# Patient Record
Sex: MALE | Race: WHITE | Employment: OTHER | URBAN - METROPOLITAN AREA
[De-identification: names, ages, dates, MRNs, and addresses within clinical notes are randomized per-mention and may not be internally consistent; named-entity substitution may affect disease eponyms.]

---

## 2022-07-12 RX ORDER — ALLOPURINOL 100 MG/1
100 TABLET ORAL DAILY
COMMUNITY

## 2022-07-12 NOTE — PROGRESS NOTES
Name_______________________________________Printed:____________________  Date and time of surgery________7/26/22 0730________________Arrival Time:_____06[facility]___________   1. The instructions given regarding when and if a patient needs to stop oral intake prior to surgery varies. Follow the specific instructions you were given                  _x__Nothing to eat or to drink after Midnight the night before.                   ____Carbo loading or ERAS instructions will be given to select patients-if you have been given those instructions -please do the following                           The evening before your surgery after dinner before midnight drink 40 ounces of gatorade. If you are diabetic use sugar free. The morning of surgery drink 40 ounces of water. This needs to be finished 3 hours prior to your surgery start time. 2. Take the following pills with a small sip of water on the morning of surgery__________carvedilol_________________________________________                  Do not take blood pressure medications ending in pril or sartan the shruthi prior to surgery or the morning of surgery_   3. Aspirin, Ibuprofen, Advil, Naproxen, Vitamin E and other Anti-inflammatory products and supplements should be stopped for 5 -7days before surgery or as directed by your physician. 4. Check with your Doctor regarding stopping Plavix, Coumadin,Eliquis, Lovenox,Effient,Pradaxa,Xarelto, Fragmin or other blood thinners and follow their instructions. 5. Do not smoke, and do not drink any alcoholic beverages 24 hours prior to surgery. This includes NA Beer. Refrain from the usage of any recreational drugs. 6. You may brush your teeth and gargle the morning of surgery. DO NOT SWALLOW WATER   7. You MUST make arrangements for a responsible adult to stay on site while you are here and take you home after your surgery. You will not be allowed to leave alone or drive yourself home.   It is strongly suggested someone stay with you the first 24 hrs. Your surgery will be cancelled if you do not have a ride home. 8. A parent/legal guardian must accompany a child scheduled for surgery and plan to stay at the hospital until the child is discharged. Please do not bring other children with you. 9. Please wear simple, loose fitting clothing to the hospital.  Cristy Regalado not bring valuables (money, credit cards, checkbooks, etc.) Do not wear any makeup (including no eye makeup) or nail polish on your fingers or toes. 10. DO NOT wear any jewelry or piercings on day of surgery. All body piercing jewelry must be removed. 11. If you have ___dentures, they will be removed before going to the OR; we will provide you a container. If you wear ___contact lenses or ___glasses, they will be removed; please bring a case for them. 12. Please see your family doctor/pediatrician for a history & physical and/or concerning medications. Bring any test results/reports from your physician's office. PCP________x__________Phone___________H&P Appt. Date________             13 If you  have a Living Will and Durable Power of  for Healthcare, please bring in a copy. 15. Notify your Surgeon if you develop any illness between now and surgery  time, cough, cold, fever, sore throat, nausea, vomiting, etc.  Please notify your surgeon if you experience dizziness, shortness of breath or blurred vision between now & the time of your surgery             15. DO NOT shave your operative site 96 hours prior to surgery. For face & neck surgery, men may use an electric razor 48 hours prior to surgery. 16. Shower the night before or morning of surgery using an antibacterial soap or as you have been instructed. 17. To provide excellent care visitors will be limited to one in the room at any given time. 18.  Please bring picture ID and insurance card.              19.  Visit our web site for

## 2022-07-14 ENCOUNTER — HOSPITAL ENCOUNTER (OUTPATIENT)
Age: 82
Discharge: HOME | End: 2022-07-14
Payer: MEDICARE

## 2022-07-14 DIAGNOSIS — Z01.818 PREOP TESTING: ICD-10-CM

## 2022-07-14 LAB
ABO/RH: NORMAL
ANION GAP SERPL CALCULATED.3IONS-SCNC: 8 MMOL/L (ref 3–16)
ANTIBODY SCREEN: NORMAL
APTT: 36.1 SEC (ref 23–34.3)
BACTERIA: ABNORMAL /HPF
BASOPHILS ABSOLUTE: 0 K/UL (ref 0–0.2)
BASOPHILS RELATIVE PERCENT: 0.5 %
BILIRUBIN URINE: NEGATIVE
BLOOD, URINE: NEGATIVE
BUN BLDV-MCNC: 20 MG/DL (ref 7–20)
CALCIUM SERPL-MCNC: 9.3 MG/DL (ref 8.3–10.6)
CHLORIDE BLD-SCNC: 103 MMOL/L (ref 99–110)
CLARITY: CLEAR
CO2: 29 MMOL/L (ref 21–32)
COLOR: YELLOW
CREAT SERPL-MCNC: 0.9 MG/DL (ref 0.8–1.3)
EKG ATRIAL RATE: 68 BPM
EKG DIAGNOSIS: NORMAL
EKG Q-T INTERVAL: 444 MS
EKG QRS DURATION: 142 MS
EKG QTC CALCULATION (BAZETT): 469 MS
EKG R AXIS: 200 DEGREES
EKG T AXIS: 41 DEGREES
EKG VENTRICULAR RATE: 67 BPM
EOSINOPHILS ABSOLUTE: 0.1 K/UL (ref 0–0.6)
EOSINOPHILS RELATIVE PERCENT: 1.3 %
EPITHELIAL CELLS, UA: 1 /HPF (ref 0–5)
GFR AFRICAN AMERICAN: >60
GFR NON-AFRICAN AMERICAN: >60
GLUCOSE BLD-MCNC: 85 MG/DL (ref 70–99)
GLUCOSE URINE: NEGATIVE MG/DL
HCT VFR BLD CALC: 42.9 % (ref 40.5–52.5)
HEMOGLOBIN: 14.3 G/DL (ref 13.5–17.5)
HYALINE CASTS: 4 /LPF (ref 0–8)
INR BLD: 1.74 (ref 0.87–1.14)
KETONES, URINE: NEGATIVE MG/DL
LEUKOCYTE ESTERASE, URINE: ABNORMAL
LYMPHOCYTES ABSOLUTE: 1 K/UL (ref 1–5.1)
LYMPHOCYTES RELATIVE PERCENT: 13.4 %
MCH RBC QN AUTO: 33.2 PG (ref 26–34)
MCHC RBC AUTO-ENTMCNC: 33.4 G/DL (ref 31–36)
MCV RBC AUTO: 99.5 FL (ref 80–100)
MICROSCOPIC EXAMINATION: YES
MONOCYTES ABSOLUTE: 0.8 K/UL (ref 0–1.3)
MONOCYTES RELATIVE PERCENT: 9.9 %
NEUTROPHILS ABSOLUTE: 5.7 K/UL (ref 1.7–7.7)
NEUTROPHILS RELATIVE PERCENT: 74.9 %
NITRITE, URINE: POSITIVE
PDW BLD-RTO: 15 % (ref 12.4–15.4)
PH UA: 6 (ref 5–8)
PLATELET # BLD: 222 K/UL (ref 135–450)
PMV BLD AUTO: 8 FL (ref 5–10.5)
POTASSIUM SERPL-SCNC: 4.6 MMOL/L (ref 3.5–5.1)
PROTEIN UA: NEGATIVE MG/DL
PROTHROMBIN TIME: 20.3 SEC (ref 11.7–14.5)
RBC # BLD: 4.31 M/UL (ref 4.2–5.9)
RBC UA: 1 /HPF (ref 0–4)
SODIUM BLD-SCNC: 140 MMOL/L (ref 136–145)
SPECIFIC GRAVITY UA: 1.01 (ref 1–1.03)
URINE TYPE: ABNORMAL
UROBILINOGEN, URINE: 0.2 E.U./DL
WBC # BLD: 7.6 K/UL (ref 4–11)
WBC UA: 89 /HPF (ref 0–5)

## 2022-07-14 PROCEDURE — 36415 COLL VENOUS BLD VENIPUNCTURE: CPT

## 2022-07-14 PROCEDURE — 93010 ELECTROCARDIOGRAM REPORT: CPT | Performed by: INTERNAL MEDICINE

## 2022-07-14 PROCEDURE — 85730 THROMBOPLASTIN TIME PARTIAL: CPT

## 2022-07-14 PROCEDURE — 93005 ELECTROCARDIOGRAM TRACING: CPT | Performed by: ANESTHESIOLOGY

## 2022-07-14 PROCEDURE — 87186 SC STD MICRODIL/AGAR DIL: CPT

## 2022-07-14 PROCEDURE — 87081 CULTURE SCREEN ONLY: CPT

## 2022-07-14 PROCEDURE — 86900 BLOOD TYPING SEROLOGIC ABO: CPT

## 2022-07-14 PROCEDURE — 85610 PROTHROMBIN TIME: CPT

## 2022-07-14 PROCEDURE — 85025 COMPLETE CBC W/AUTO DIFF WBC: CPT

## 2022-07-14 PROCEDURE — 87086 URINE CULTURE/COLONY COUNT: CPT

## 2022-07-14 PROCEDURE — 86850 RBC ANTIBODY SCREEN: CPT

## 2022-07-14 PROCEDURE — 87088 URINE BACTERIA CULTURE: CPT

## 2022-07-14 PROCEDURE — 86901 BLOOD TYPING SEROLOGIC RH(D): CPT

## 2022-07-14 PROCEDURE — 81001 URINALYSIS AUTO W/SCOPE: CPT

## 2022-07-14 PROCEDURE — 80048 BASIC METABOLIC PNL TOTAL CA: CPT

## 2022-07-15 NOTE — CARE COORDINATION
ORTHOPAEDIC NURSE NAVIGATOR SUMMARY NOTE  Sent email message to [facility], Dr. Yo Frazier assistant to help him get an antibiotic for his UTI. Surgery Details  Anticipated Date of Surgery: 07/26/2022  Surgery: right knee  Surgeon: Zak Johnson  Recieved Pre-Op Education: no picked up folder  If pt did not complete either, why not? N/A    PCP  PCP: Joanie Steiner DO   Phone #: [phone]    Date of PCP Visit for H&P: scheduled 7/21  Any Noted Concerns from PCP prior to surgery:  no  If Yes, what concerns?:    Review of Past Medical History Reveals History of:    Patient Medical Hx:  Past Medical History:   Diagnosis Date    Aortic aneurysm ([facility] [address]     Atrial fibrillation ([facility] [address]     Hyperlipidemia     RADHA on CPAP     Osteoporosis     Self-catheterizes urinary bladder     Unspecified sleep apnea      Patients Surgical Hx:   Past Surgical History:   Procedure Laterality Date    BUNIONECTOMY Left     COLONOSCOPY  2005    CYSTOSCOPY  2013    Dr. Margaret Roa Bilateral     realignment    OTHER SURGICAL HISTORY      blood clot in right common illiac vein    PACEMAKER PLACEMENT      PROSTATE SURGERY      turp    THORACIC AORTIC ANEURYSM REPAIR        Patients Social Hx:  Social History       Tobacco History       Smoking Status  Former Quit Date  5/23/1960 Smoking Frequency  0.50 packs/day for 10.00 years (5.00 pk-yrs) Smoking Tobacco Type  Cigarettes quit in 5/23/1960      Smokeless Tobacco Use  Never              Alcohol History       Alcohol Use Status  No              Drug Use       Drug Use Status  No              Sexual Activity       Sexually Active  Yes Partners  Female                  Alcohol use:  Alcohol Use: Not on file     Home Medications:  Prior to Admission medications    Medication Sig Start Date End Date Taking?  Authorizing Provider   allopurinol (ZYLOPRIM) 100 MG tablet Take 100 mg by mouth daily   Yes Historical Provider, MD   CRANBERRY EXTRACT PO Take by mouth   Yes Historical Provider, MD   warfarin (COUMADIN) 5 MG tablet TAKE 1 TABLET BY MOUTH EVERY DAY 6/8/16   Simi Newell DO   carvedilol (COREG) 12.5 MG tablet  3/3/16   Tyler Morton   torsemide (DEMADEX) 20 MG tablet TAKE 1 TABLET BY MOUTH DAILY 4/12/16   Jonathon Hull, DO   fluticasone (FLONASE) 50 MCG/ACT nasal spray USE ONE SPRAY IN EACH NOSTRIL ONCE DAILY 4/12/16   Simi Newell, DO   warfarin (COUMADIN) 6 MG tablet Take 1 tablet by mouth daily 2/18/16   Jonathon Hull, DO   alendronate (FOSAMAX) 70 MG tablet TAKE 1 TABLET EVERY WEEK 2/11/16   Shola Hull, DO   Psyllium (METAMUCIL PO) Take 30 mg by mouth    Historical Provider, MD   tamsulosin (FLOMAX) 0.4 MG capsule Take 0.4 mg by mouth daily. 3/13/13   Historical Provider, MD   NITROSTAT 0.4 MG SL tablet Place 0.4 mg under the tongue every 5 minutes as needed. 2/25/13   Historical Provider, MD   lisinopril (PRINIVIL;ZESTRIL) 2.5 MG tablet Take  by mouth daily. 5/6/13   Historical Provider, MD   Omega-3 Fatty Acids (FISH OIL) 1200 MG CAPS Take 1,200 mg by mouth daily. Historical Provider, MD   Calcium Carbonate (CALTRATE 600 PO) Take 600 mg by mouth daily. Historical Provider, MD        Medication Contract and Consent for Opioid Use Documents Filed        No documents found                   Pain Management Program:  unknown    Lab Values:   Hgb/Hct:   Hemoglobin (g/dL)   Date Value   07/14/2022 14.3   /  Hematocrit (%)   Date Value   07/14/2022 42.9      HgbA1C:    Lab Results   Component Value Date    LABA1C 5.0 08/01/2014      Albumin:    Lab Results   Component Value Date    LABALBU 3.8 06/07/2016      BUN/Cr:   BUN (mg/dL)   Date Value   07/14/2022 20   /  CREATININE (mg/dL)   Date Value   07/14/2022 0.9       Coronary Artery Disease/HTN/CHF History: Yes     Cardiologist: Dr. Molina Royalty    On Anticoagulation? Yes,  If YES, when will they stop taking?  [address] for recommendations from PCP       Diabetes History: No   Most Recent HgbA1C:   Lab Results   Component Value Date    LABA1C 5.0 2014     Pulmonary: COPD/emphysema/Asthma  Use of home oxygen: no    Tobacco Use      Smoking status: Former        Packs/day: 0.50        Years: 10.00        Pack years: 5        Types: Cigarettes        Quit date: 1960        Years since quittin.1      Smokeless tobacco: Never    Referred for Smoking Cessation: No  STOP-BANG SCREEN RISK FACTORS: USES CPAP      DVT Risk Stratification:  higher risk, due to afib. BMI Greater than 40 at time of scheduling?: No   BMI:    BMI Readings from Last 1 Encounters:   22 34.87 kg/m²       Pre-HAB  Prehab Ordered: yes  Attended Pre-Hab Program: yes    Additional Medical Concerns:        Discharge Disposition Information:   Patient insurance on file: Medicare   Anticipated Discharge Disposition:  home with op pt. Already set up, near home. Unsure of location   Who will be with patient at home following discharge? Patient's wife and son, romina Tran,    Equipment pt already has: straight cane, rolling walker, and raised toilet seats   Levels in Home: 3   Bedroom on first or second floor: main   Bathroom on first or second floor: main level   Pre-op ambulatory dme: Independent with Cane   Number of entry steps: 1 or 2 steps    Pt agrees to [address]: Yes         Social Determinants of Health     Tobacco Use: Medium Risk    Smoking Tobacco Use: Former    Smokeless Tobacco Use: Never   Alcohol Use: Not on file   Financial Resource Strain: Not on file   Food Insecurity: Not on file   Transportation Needs: Not on file   Physical Activity: Not on file   Stress: Not on file   Social Connections: Not on file   Intimate Partner Violence: Not on file   Depression: Not on file   Housing Stability: Not on file          Ulises Pepper RN   7/15/2022  Orthopedic Nurse [address] [place] [address]  [phone]  [email or web address]

## 2022-07-16 LAB
MRSA CULTURE ONLY: NORMAL
ORGANISM: ABNORMAL
URINE CULTURE, ROUTINE: ABNORMAL

## 2022-07-18 NOTE — PROGRESS NOTES
Leah at Dr. Karley Augustine office notified of pt's urine cx and states they will notify pt and treat. No further orders received.

## 2022-07-25 ENCOUNTER — ANESTHESIA EVENT (OUTPATIENT)
Dept: OPERATING ROOM | Age: 82
End: 2022-07-25
Payer: MEDICARE

## 2022-07-25 NOTE — DISCHARGE INSTR - OTHER ORDERS
333  [facility] for Joint Replacement Model  Notification Letter    [facility] is participating in a New Care Improvement Initiative from [facility] is participating in a Medicare initiative called the [address] [place] [address] for Joint Replacement (CJR) model. The CJR model aims to promote quality and financial accountability for care surrounding lower-extremity joint replacement (LEJR) procedures, commonly referred to as hip and knee replacements and/or other major leg procedures. [address] participation in the [address] [place] [address],[address] [place] [address] should not restrict your access to care for your medical condition or your freedom to choose your health care providers and services. All existing Medicare beneficiary protections continue to be available to you. These include the ability to report concerns of substandard care to Quality Improvement Organizations and 1-800-MEDICARE. The CJR model aims to help give you better care. The CJR model aims to support better and more efficient care for beneficiaries undergoing LEJR procedures. A CJR episode of care is typically defined as an admission of an eligible Medicare beneficiary to a hospital participating in the [address] [place] [address],[address] [place] [address] that eventually results in a discharge paid under Medicare Severity-Diagnosis Related Groups (MS-DRG) 469 (major joint replacement or reattachment of lower extremity with major complications or comorbidities) or 470 (major joint replacement or reattachment of lower extremity without major complications or comorbidities). The CJR episode of care continues for 90 days following discharge.  This model tests bundled payment and quality measurement for an episode of care associated with LEJR procedures to encourage hospitals, physicians, and post-acute care providers to work together to improve the quality and coordination of care from the initial hospitalization through recovery. Through this bundled payment model, [facility] will receive additional payments if quality and spending performance are strong or, if not, potentially have to repay Medicare for a portion of the spending for care surrounding a lower extremity joint replacement procedure. Medicare is using the CJR model to encourage [facility] to work more closely with your doctors and other health care providers that help patients recover after discharge from the hospital, including nursing homes (skilled nursing facilities), home health agencies, inpatient rehabilitation facilities, and long-term care hospitals. The goal of the model is to encourage these providers and suppliers to provide you with better, more coordinated care during and following your hospital stay. The model is expected to lower the cost of care to [facility] but your costs for covered care will not increase due to these changes. [facility] is working closely with the doctors and other health care providers and suppliers who will care for you during and following your hospital stay and extending through the recovery period. By working together, your health care providers and suppliers are planning more efficient, high quality care as you undergo treatment. Medicare will monitor your care to ensure you and others are receiving high quality care. It's your choice which hospital, doctor, or other providers you use. You have the right to choose which hospital, doctor, or other post-hospital stay health care provider you use. To find a different doctor, visit Medicare's Physician Compare website, HDTapes.co.[email or web address], or call 1-800-MEDICARE (269 7736). TTY users should call 7-285.577.3710. To find a different hospital, visit [employer]Commutable or call 1-800-MEDICARE (754 3582).  TTY users should call [phone]. To find a different skilled nursing facility, visit [facility] website, [email or web address] or call 1-800-MEDICARE (5-307.631.9680). TTY users should call 5-774.585.3512. To find a different home health agency, visit [employer] [facility] [employer] website, [employer].Reedsy, or call 1-800-MEDICARE (5-573.107.7745). TTY users should call 5-833.204.3107. If you believe that your care is adversely affected or have concerns about substandard care, you may call 1-800-MEDICARE or contact your state's Quality Improvement Organization by going to: [employer].Alignable. For an explanation of how patients can access their health care records and beneficiary claims data, please visit [email or web address]. Get more information     If you have questions or want more information about the Comprehensive Care for Joint Replacement (CJR) model, call Fidencio Zavala RN, Care Transitions, [facility] at [phone] or call 1-800-MEDICARE. You can also find additional information at https://innovation.cms.gov/initiatives/cjr.

## 2022-07-26 ENCOUNTER — APPOINTMENT (OUTPATIENT)
Dept: GENERAL RADIOLOGY | Age: 82
End: 2022-07-26
Attending: ORTHOPAEDIC SURGERY
Payer: MEDICARE

## 2022-07-26 ENCOUNTER — ANESTHESIA (OUTPATIENT)
Dept: OPERATING ROOM | Age: 82
End: 2022-07-26
Payer: MEDICARE

## 2022-07-26 ENCOUNTER — HOSPITAL ENCOUNTER (OUTPATIENT)
Age: 82
Setting detail: OUTPATIENT SURGERY
Discharge: HOME | End: 2022-07-26
Attending: ORTHOPAEDIC SURGERY | Admitting: ORTHOPAEDIC SURGERY
Payer: MEDICARE

## 2022-07-26 VITALS
HEART RATE: 60 BPM | RESPIRATION RATE: 18 BRPM | TEMPERATURE: 97 F | WEIGHT: 240 LBS | DIASTOLIC BLOOD PRESSURE: 68 MMHG | BODY MASS INDEX: 34.36 KG/M2 | HEIGHT: 70 IN | SYSTOLIC BLOOD PRESSURE: 122 MMHG | OXYGEN SATURATION: 95 %

## 2022-07-26 DIAGNOSIS — Z01.818 PREOP TESTING: Primary | ICD-10-CM

## 2022-07-26 LAB
ABO/RH: NORMAL
ANTIBODY SCREEN: NORMAL
APTT: 41.1 SEC (ref 23–34.3)
GLUCOSE BLD-MCNC: 94 MG/DL (ref 70–99)
INR BLD: 1.26 (ref 0.87–1.14)
PERFORMED ON: NORMAL
PROTHROMBIN TIME: 15.8 SEC (ref 11.7–14.5)

## 2022-07-26 PROCEDURE — 73560 X-RAY EXAM OF KNEE 1 OR 2: CPT

## 2022-07-26 PROCEDURE — 2500000003 HC RX 250 WO HCPCS: Performed by: ORTHOPAEDIC SURGERY

## 2022-07-26 PROCEDURE — 6360000002 HC RX W HCPCS: Performed by: ORTHOPAEDIC SURGERY

## 2022-07-26 PROCEDURE — 97165 OT EVAL LOW COMPLEX 30 MIN: CPT

## 2022-07-26 PROCEDURE — 6360000002 HC RX W HCPCS: Performed by: NURSE ANESTHETIST, CERTIFIED REGISTERED

## 2022-07-26 PROCEDURE — 6360000002 HC RX W HCPCS

## 2022-07-26 PROCEDURE — 7100000001 HC PACU RECOVERY - ADDTL 15 MIN: Performed by: ORTHOPAEDIC SURGERY

## 2022-07-26 PROCEDURE — 3600000014 HC SURGERY LEVEL 4 ADDTL 15MIN: Performed by: ORTHOPAEDIC SURGERY

## 2022-07-26 PROCEDURE — 7100000000 HC PACU RECOVERY - FIRST 15 MIN: Performed by: ORTHOPAEDIC SURGERY

## 2022-07-26 PROCEDURE — 2500000003 HC RX 250 WO HCPCS: Performed by: NURSE ANESTHETIST, CERTIFIED REGISTERED

## 2022-07-26 PROCEDURE — A4217 STERILE WATER/SALINE, 500 ML: HCPCS | Performed by: ORTHOPAEDIC SURGERY

## 2022-07-26 PROCEDURE — 86901 BLOOD TYPING SEROLOGIC RH(D): CPT

## 2022-07-26 PROCEDURE — 85730 THROMBOPLASTIN TIME PARTIAL: CPT

## 2022-07-26 PROCEDURE — 64447 NJX AA&/STRD FEMORAL NRV IMG: CPT | Performed by: ANESTHESIOLOGY

## 2022-07-26 PROCEDURE — 2580000003 HC RX 258: Performed by: ORTHOPAEDIC SURGERY

## 2022-07-26 PROCEDURE — 86900 BLOOD TYPING SEROLOGIC ABO: CPT

## 2022-07-26 PROCEDURE — 3700000000 HC ANESTHESIA ATTENDED CARE: Performed by: ORTHOPAEDIC SURGERY

## 2022-07-26 PROCEDURE — C1776 JOINT DEVICE (IMPLANTABLE): HCPCS | Performed by: ORTHOPAEDIC SURGERY

## 2022-07-26 PROCEDURE — 7100000011 HC PHASE II RECOVERY - ADDTL 15 MIN: Performed by: ORTHOPAEDIC SURGERY

## 2022-07-26 PROCEDURE — C1713 ANCHOR/SCREW BN/BN,TIS/BN: HCPCS | Performed by: ORTHOPAEDIC SURGERY

## 2022-07-26 PROCEDURE — 3700000001 HC ADD 15 MINUTES (ANESTHESIA): Performed by: ORTHOPAEDIC SURGERY

## 2022-07-26 PROCEDURE — 97161 PT EVAL LOW COMPLEX 20 MIN: CPT

## 2022-07-26 PROCEDURE — 85610 PROTHROMBIN TIME: CPT

## 2022-07-26 PROCEDURE — 7100000010 HC PHASE II RECOVERY - FIRST 15 MIN: Performed by: ORTHOPAEDIC SURGERY

## 2022-07-26 PROCEDURE — 86850 RBC ANTIBODY SCREEN: CPT

## 2022-07-26 PROCEDURE — 3600000004 HC SURGERY LEVEL 4 BASE: Performed by: ORTHOPAEDIC SURGERY

## 2022-07-26 PROCEDURE — 2500000003 HC RX 250 WO HCPCS: Performed by: ANESTHESIOLOGY

## 2022-07-26 PROCEDURE — 6360000002 HC RX W HCPCS: Performed by: ANESTHESIOLOGY

## 2022-07-26 PROCEDURE — 2580000003 HC RX 258

## 2022-07-26 PROCEDURE — 2720000010 HC SURG SUPPLY STERILE: Performed by: ORTHOPAEDIC SURGERY

## 2022-07-26 PROCEDURE — 2709999900 HC NON-CHARGEABLE SUPPLY: Performed by: ORTHOPAEDIC SURGERY

## 2022-07-26 PROCEDURE — 97535 SELF CARE MNGMENT TRAINING: CPT

## 2022-07-26 PROCEDURE — 97530 THERAPEUTIC ACTIVITIES: CPT

## 2022-07-26 DEVICE — PSN TIB STM 5 DEG SZ H R: Type: IMPLANTABLE DEVICE | Site: KNEE | Status: FUNCTIONAL

## 2022-07-26 DEVICE — PSN MC VE ASF R 14MM 8-11 GH: Type: IMPLANTABLE DEVICE | Site: KNEE | Status: FUNCTIONAL

## 2022-07-26 DEVICE — COMPONENT PAT DIA35MM THK9MM STD KNEE VIVACIT-E CEM PERSONA: Type: IMPLANTABLE DEVICE | Site: KNEE | Status: FUNCTIONAL

## 2022-07-26 DEVICE — CEMENT BNE 40GM HI VISC RADPQ FOR REV SURG: Type: IMPLANTABLE DEVICE | Site: KNEE | Status: FUNCTIONAL

## 2022-07-26 DEVICE — IMPLANTABLE DEVICE: Type: IMPLANTABLE DEVICE | Site: KNEE | Status: FUNCTIONAL

## 2022-07-26 RX ORDER — ONDANSETRON 2 MG/ML
4 INJECTION INTRAMUSCULAR; INTRAVENOUS
Status: COMPLETED | OUTPATIENT
Start: 2022-07-26 | End: 2022-07-26

## 2022-07-26 RX ORDER — PROPOFOL 10 MG/ML
INJECTION, EMULSION INTRAVENOUS PRN
Status: DISCONTINUED | OUTPATIENT
Start: 2022-07-26 | End: 2022-07-26 | Stop reason: SDUPTHER

## 2022-07-26 RX ORDER — DEXMEDETOMIDINE HYDROCHLORIDE 100 UG/ML
INJECTION, SOLUTION INTRAVENOUS PRN
Status: DISCONTINUED | OUTPATIENT
Start: 2022-07-26 | End: 2022-07-26 | Stop reason: SDUPTHER

## 2022-07-26 RX ORDER — HYDROMORPHONE HCL 110MG/55ML
0.25 PATIENT CONTROLLED ANALGESIA SYRINGE INTRAVENOUS
Status: CANCELLED | OUTPATIENT
Start: 2022-07-26

## 2022-07-26 RX ORDER — DEXAMETHASONE SODIUM PHOSPHATE 4 MG/ML
INJECTION, SOLUTION INTRA-ARTICULAR; INTRALESIONAL; INTRAMUSCULAR; INTRAVENOUS; SOFT TISSUE PRN
Status: DISCONTINUED | OUTPATIENT
Start: 2022-07-26 | End: 2022-07-26 | Stop reason: SDUPTHER

## 2022-07-26 RX ORDER — LIDOCAINE HYDROCHLORIDE 20 MG/ML
INJECTION, SOLUTION EPIDURAL; INFILTRATION; INTRACAUDAL; PERINEURAL PRN
Status: DISCONTINUED | OUTPATIENT
Start: 2022-07-26 | End: 2022-07-26 | Stop reason: SDUPTHER

## 2022-07-26 RX ORDER — KETAMINE HCL IN NACL, ISO-OSM 100MG/10ML
SYRINGE (ML) INJECTION PRN
Status: DISCONTINUED | OUTPATIENT
Start: 2022-07-26 | End: 2022-07-26 | Stop reason: SDUPTHER

## 2022-07-26 RX ORDER — LIDOCAINE HYDROCHLORIDE 10 MG/ML
1 INJECTION, SOLUTION EPIDURAL; INFILTRATION; INTRACAUDAL; PERINEURAL
Status: CANCELLED | OUTPATIENT
Start: 2022-07-26 | End: 2022-07-26

## 2022-07-26 RX ORDER — SODIUM CHLORIDE, SODIUM LACTATE, POTASSIUM CHLORIDE, CALCIUM CHLORIDE 600; 310; 30; 20 MG/100ML; MG/100ML; MG/100ML; MG/100ML
INJECTION, SOLUTION INTRAVENOUS CONTINUOUS
Status: DISCONTINUED | OUTPATIENT
Start: 2022-07-26 | End: 2022-07-26 | Stop reason: HOSPADM

## 2022-07-26 RX ORDER — LABETALOL HYDROCHLORIDE 5 MG/ML
10 INJECTION, SOLUTION INTRAVENOUS
Status: DISCONTINUED | OUTPATIENT
Start: 2022-07-26 | End: 2022-07-26 | Stop reason: HOSPADM

## 2022-07-26 RX ORDER — SODIUM CHLORIDE 9 MG/ML
INJECTION, SOLUTION INTRAVENOUS CONTINUOUS
Status: CANCELLED | OUTPATIENT
Start: 2022-07-26

## 2022-07-26 RX ORDER — FENTANYL CITRATE 50 UG/ML
25 INJECTION, SOLUTION INTRAMUSCULAR; INTRAVENOUS EVERY 5 MIN PRN
Status: DISCONTINUED | OUTPATIENT
Start: 2022-07-26 | End: 2022-07-26 | Stop reason: HOSPADM

## 2022-07-26 RX ORDER — ONDANSETRON 2 MG/ML
4 INJECTION INTRAMUSCULAR; INTRAVENOUS EVERY 6 HOURS PRN
Status: CANCELLED | OUTPATIENT
Start: 2022-07-26

## 2022-07-26 RX ORDER — HYDROMORPHONE HCL 110MG/55ML
0.25 PATIENT CONTROLLED ANALGESIA SYRINGE INTRAVENOUS EVERY 5 MIN PRN
Status: DISCONTINUED | OUTPATIENT
Start: 2022-07-26 | End: 2022-07-26 | Stop reason: HOSPADM

## 2022-07-26 RX ORDER — ACETAMINOPHEN 325 MG/1
650 TABLET ORAL EVERY 6 HOURS
Status: CANCELLED | OUTPATIENT
Start: 2022-07-26

## 2022-07-26 RX ORDER — SUCCINYLCHOLINE/SOD CL,ISO/PF 200MG/10ML
SYRINGE (ML) INTRAVENOUS PRN
Status: DISCONTINUED | OUTPATIENT
Start: 2022-07-26 | End: 2022-07-26 | Stop reason: SDUPTHER

## 2022-07-26 RX ORDER — PHENYLEPHRINE HYDROCHLORIDE 10 MG/ML
INJECTION INTRAVENOUS PRN
Status: DISCONTINUED | OUTPATIENT
Start: 2022-07-26 | End: 2022-07-26 | Stop reason: SDUPTHER

## 2022-07-26 RX ORDER — IBUPROFEN 400 MG/1
400 TABLET ORAL EVERY 12 HOURS
Status: CANCELLED | OUTPATIENT
Start: 2022-07-26 | End: 2022-07-29

## 2022-07-26 RX ORDER — HYDROMORPHONE HCL 110MG/55ML
0.5 PATIENT CONTROLLED ANALGESIA SYRINGE INTRAVENOUS
Status: CANCELLED | OUTPATIENT
Start: 2022-07-26

## 2022-07-26 RX ORDER — ROCURONIUM BROMIDE 10 MG/ML
INJECTION, SOLUTION INTRAVENOUS PRN
Status: DISCONTINUED | OUTPATIENT
Start: 2022-07-26 | End: 2022-07-26 | Stop reason: SDUPTHER

## 2022-07-26 RX ORDER — BUPIVACAINE HYDROCHLORIDE 5 MG/ML
INJECTION, SOLUTION EPIDURAL; INTRACAUDAL
Status: COMPLETED | OUTPATIENT
Start: 2022-07-26 | End: 2022-07-26

## 2022-07-26 RX ORDER — LIDOCAINE HYDROCHLORIDE 10 MG/ML
0.5 INJECTION, SOLUTION EPIDURAL; INFILTRATION; INTRACAUDAL; PERINEURAL ONCE
Status: DISCONTINUED | OUTPATIENT
Start: 2022-07-26 | End: 2022-07-26 | Stop reason: HOSPADM

## 2022-07-26 RX ORDER — OXYCODONE HYDROCHLORIDE 5 MG/1
5 TABLET ORAL
Status: DISCONTINUED | OUTPATIENT
Start: 2022-07-26 | End: 2022-07-26 | Stop reason: HOSPADM

## 2022-07-26 RX ORDER — PROMETHAZINE HYDROCHLORIDE 25 MG/1
12.5 TABLET ORAL EVERY 6 HOURS PRN
Status: CANCELLED | OUTPATIENT
Start: 2022-07-26

## 2022-07-26 RX ORDER — FENTANYL CITRATE 50 UG/ML
INJECTION, SOLUTION INTRAMUSCULAR; INTRAVENOUS PRN
Status: DISCONTINUED | OUTPATIENT
Start: 2022-07-26 | End: 2022-07-26 | Stop reason: SDUPTHER

## 2022-07-26 RX ORDER — ONDANSETRON 2 MG/ML
INJECTION INTRAMUSCULAR; INTRAVENOUS PRN
Status: DISCONTINUED | OUTPATIENT
Start: 2022-07-26 | End: 2022-07-26 | Stop reason: SDUPTHER

## 2022-07-26 RX ORDER — SODIUM CHLORIDE 0.9 % (FLUSH) 0.9 %
5-40 SYRINGE (ML) INJECTION EVERY 12 HOURS SCHEDULED
Status: CANCELLED | OUTPATIENT
Start: 2022-07-26

## 2022-07-26 RX ORDER — PROCHLORPERAZINE EDISYLATE 5 MG/ML
5 INJECTION INTRAMUSCULAR; INTRAVENOUS
Status: DISCONTINUED | OUTPATIENT
Start: 2022-07-26 | End: 2022-07-26 | Stop reason: HOSPADM

## 2022-07-26 RX ORDER — SODIUM CHLORIDE 0.9 % (FLUSH) 0.9 %
5-40 SYRINGE (ML) INJECTION PRN
Status: CANCELLED | OUTPATIENT
Start: 2022-07-26

## 2022-07-26 RX ORDER — SODIUM CHLORIDE 9 MG/ML
INJECTION, SOLUTION INTRAVENOUS PRN
Status: CANCELLED | OUTPATIENT
Start: 2022-07-26

## 2022-07-26 RX ORDER — HYDRALAZINE HYDROCHLORIDE 20 MG/ML
10 INJECTION INTRAMUSCULAR; INTRAVENOUS
Status: DISCONTINUED | OUTPATIENT
Start: 2022-07-26 | End: 2022-07-26 | Stop reason: HOSPADM

## 2022-07-26 RX ADMIN — DEXMEDETOMIDINE HYDROCHLORIDE 4 MCG: 100 INJECTION, SOLUTION INTRAVENOUS at 08:06

## 2022-07-26 RX ADMIN — ROCURONIUM BROMIDE 30 MG: 10 INJECTION, SOLUTION INTRAVENOUS at 07:43

## 2022-07-26 RX ADMIN — SODIUM CHLORIDE, POTASSIUM CHLORIDE, SODIUM LACTATE AND CALCIUM CHLORIDE: 600; 310; 30; 20 INJECTION, SOLUTION INTRAVENOUS at 06:38

## 2022-07-26 RX ADMIN — DEXAMETHASONE SODIUM PHOSPHATE 4 MG: 4 INJECTION, SOLUTION INTRAMUSCULAR; INTRAVENOUS at 07:47

## 2022-07-26 RX ADMIN — TRANEXAMIC ACID 1000 MG: 1 INJECTION, SOLUTION INTRAVENOUS at 07:19

## 2022-07-26 RX ADMIN — SUGAMMADEX 200 MG: 100 INJECTION, SOLUTION INTRAVENOUS at 08:55

## 2022-07-26 RX ADMIN — PHENYLEPHRINE HYDROCHLORIDE 100 MCG: 10 INJECTION INTRAVENOUS at 07:46

## 2022-07-26 RX ADMIN — CEFAZOLIN 2000 MG: 2 INJECTION, POWDER, FOR SOLUTION INTRAMUSCULAR; INTRAVENOUS at 12:07

## 2022-07-26 RX ADMIN — Medication 10 MG: at 08:41

## 2022-07-26 RX ADMIN — ROCURONIUM BROMIDE 10 MG: 10 INJECTION, SOLUTION INTRAVENOUS at 07:33

## 2022-07-26 RX ADMIN — BUPIVACAINE HYDROCHLORIDE 20 ML: 5 INJECTION, SOLUTION EPIDURAL; INTRACAUDAL at 07:11

## 2022-07-26 RX ADMIN — ONDANSETRON 4 MG: 2 INJECTION INTRAMUSCULAR; INTRAVENOUS at 07:47

## 2022-07-26 RX ADMIN — DEXMEDETOMIDINE HYDROCHLORIDE 4 MCG: 100 INJECTION, SOLUTION INTRAVENOUS at 08:42

## 2022-07-26 RX ADMIN — LIDOCAINE HYDROCHLORIDE 60 MG: 20 INJECTION, SOLUTION EPIDURAL; INFILTRATION; INTRACAUDAL; PERINEURAL at 07:33

## 2022-07-26 RX ADMIN — Medication 30 MG: at 07:31

## 2022-07-26 RX ADMIN — FENTANYL CITRATE 25 MCG: 50 INJECTION, SOLUTION INTRAMUSCULAR; INTRAVENOUS at 09:00

## 2022-07-26 RX ADMIN — ONDANSETRON 4 MG: 2 INJECTION INTRAMUSCULAR; INTRAVENOUS at 10:06

## 2022-07-26 RX ADMIN — CEFAZOLIN 2000 MG: 2 INJECTION, POWDER, FOR SOLUTION INTRAMUSCULAR; INTRAVENOUS at 07:38

## 2022-07-26 RX ADMIN — Medication 160 MG: at 07:33

## 2022-07-26 RX ADMIN — PHENYLEPHRINE HYDROCHLORIDE 100 MCG: 10 INJECTION INTRAVENOUS at 07:48

## 2022-07-26 RX ADMIN — ROCURONIUM BROMIDE 10 MG: 10 INJECTION, SOLUTION INTRAVENOUS at 08:38

## 2022-07-26 RX ADMIN — DEXMEDETOMIDINE HYDROCHLORIDE 8 MCG: 100 INJECTION, SOLUTION INTRAVENOUS at 07:55

## 2022-07-26 RX ADMIN — TRANEXAMIC ACID 1000 MG: 1 INJECTION, SOLUTION INTRAVENOUS at 08:46

## 2022-07-26 RX ADMIN — PROPOFOL 150 MG: 10 INJECTION, EMULSION INTRAVENOUS at 07:33

## 2022-07-26 RX ADMIN — FENTANYL CITRATE 50 MCG: 50 INJECTION, SOLUTION INTRAMUSCULAR; INTRAVENOUS at 08:54

## 2022-07-26 RX ADMIN — Medication 10 MG: at 08:06

## 2022-07-26 RX ADMIN — FENTANYL CITRATE 25 MCG: 50 INJECTION, SOLUTION INTRAMUSCULAR; INTRAVENOUS at 07:31

## 2022-07-26 ASSESSMENT — PAIN SCALES - GENERAL
PAINLEVEL_OUTOF10: 4
PAINLEVEL_OUTOF10: 4
PAINLEVEL_OUTOF10: 3

## 2022-07-26 ASSESSMENT — PAIN DESCRIPTION - DESCRIPTORS
DESCRIPTORS: ACHING
DESCRIPTORS: ACHING

## 2022-07-26 ASSESSMENT — ENCOUNTER SYMPTOMS: SHORTNESS OF BREATH: 0

## 2022-07-26 ASSESSMENT — PAIN DESCRIPTION - PAIN TYPE
TYPE: SURGICAL PAIN
TYPE: SURGICAL PAIN

## 2022-07-26 ASSESSMENT — PAIN - FUNCTIONAL ASSESSMENT: PAIN_FUNCTIONAL_ASSESSMENT: 0-10

## 2022-07-26 ASSESSMENT — PAIN DESCRIPTION - ORIENTATION
ORIENTATION: RIGHT
ORIENTATION: RIGHT

## 2022-07-26 ASSESSMENT — PAIN DESCRIPTION - LOCATION
LOCATION: KNEE
LOCATION: KNEE

## 2022-07-26 ASSESSMENT — PAIN DESCRIPTION - FREQUENCY
FREQUENCY: CONTINUOUS
FREQUENCY: CONTINUOUS

## 2022-07-26 NOTE — PROGRESS NOTES
Pt assisted to sitting position on side of bed. Pt able to tolerate well. PT/OT contacted for eval. Pt reports nausea with position change. Pt given Zofran as ordered. Pt reports improvement with nausea.

## 2022-07-26 NOTE — PROGRESS NOTES
Pt awake and alert. Pt on RA, VSS. Pt reports acceptable 4/10 pain declines need for pain medication. Denies nausea, tolerating PO. Surrounding skin warm, palpable pulses and able to move toes. Pt meets criteria to be discharged from Phase 1. Will continue to monitor.

## 2022-07-26 NOTE — BRIEF OP NOTE
Brief Postoperative Note      Patient: Karrie Muhammad  YOB: 1940  MRN: [id number]    Date of Procedure: 7/26/2022    Pre-Op Diagnosis: M17.11 RIGHT KNEE OSTEOARTHRITIS    Post-Op Diagnosis: Same       Procedure(s):  RIGHT TOTAL KNEE ARTHROPLASTY-BLOCK NON-CONTINUOUS-AILEEN    Surgeon(s):  Brianne Garcias MD    First assist: shagufta ann pa-c    Assistant:  Surgical Assistant: Pricila Smith    Anesthesia: General    Estimated Blood Loss (mL): less than 50     Complications: None    Specimens:   * No specimens in log *    Implants:  Implant Name Type Inv.  Item Serial No.  Lot No. LRB No. Used Action   CEMENT BNE 40GM HI VISC RADPQ FOR REV SURG - [id number]  CEMENT BNE 40GM HI VISC RADPQ FOR REV SURG  [employer]- [id number] Right 1 Implanted   CEMENT BNE 40GM HI VISC RADPQ FOR REV SURG - [id number]  CEMENT BNE 40GM HI VISC RADPQ FOR REV SURG  [employer]- [id number] Right 1 Implanted   PSN TIB STM 5 DEG SZ H R - [id number]  PSN TIB STM 5 DEG SZ H R  [employer]- [id number] Right 1 Implanted   COMPONENT PAT [id number] THK9MM STD KNEE VIVACIT-E BISI PERSONA - [id number]  COMPONENT PAT [id number] THK9MM STD KNEE VIVACIT-E BISI PERSONA  [employer]- [id number] Right 1 Implanted   COMPONENT FEM SZ 11 STD R KNEE CO CHROME CRUCE RET BISI PERSO - [id number]  COMPONENT FEM SZ 11 STD R KNEE CO CHROME CRUCE RET BISI PERSO  [employer]- [id number] Right 1 Implanted   PSN MC VE ASF R 14MM 8-11 GH - [id number]  PSN MC VE ASF R 14MM 8-11 GH  [employer]- [id number] Right 1 Implanted         Drains: * No LDAs found *    Findings: oa varus severe    Electronically signed by Brianne Garcias MD on 7/26/2022 at 8:49 AM

## 2022-07-26 NOTE — PROGRESS NOTES
Pt assisted to lying position and repositioned in bed. Pt tolerated well. Ice pack applied to right knee. Pt denies any further needs at this time. Will continue to monitor.

## 2022-07-26 NOTE — PROGRESS NOTES
Time out done, @ room air,then Dr Jannie Davies completed right adductor canal block, patient tolerated procedure well.

## 2022-07-26 NOTE — ANESTHESIA POSTPROCEDURE EVALUATION
Department of Anesthesiology  Postprocedure Note    Patient: Delfino Sandoval  MRN: [id number]  YOB: 1940  Date of evaluation: 7/26/2022      Procedure Summary     Date: 07/26/22 Room / Location: [address]    Anesthesia Start: 9928 Anesthesia Stop: 8432    Procedure: RIGHT TOTAL KNEE ARTHROPLASTY-BLOCK NON-CONTINUOUS-AILEEN (Right: Knee) Diagnosis:       Osteoarthritis of right knee, unspecified osteoarthritis type      (M17.11 RIGHT KNEE OSTEOARTHRITIS)    Surgeons: Anjel Huffman MD Responsible Provider: Petty Novoa MD    Anesthesia Type: general, regional ASA Status: 3          Anesthesia Type: No value filed.     Jazmin Phase I: Jazmin Score: 10    Jazmin Phase II:        Anesthesia Post Evaluation    Patient location during evaluation: PACU  Patient participation: complete - patient participated  Level of consciousness: awake and alert  Airway patency: patent  Nausea & Vomiting: no nausea and no vomiting  Complications: no  Cardiovascular status: hemodynamically stable  Respiratory status: acceptable  Hydration status: stable  Multimodal analgesia pain management approach

## 2022-07-26 NOTE — PROGRESS NOTES
Pt able to tolerate PO without N/V. Pt reports continued nausea relief with PO and nausea medication.

## 2022-07-26 NOTE — OP NOTE
Operative Note      Patient: Jr Palacios  YOB: 1940  MRN: [id number]    Date of Procedure: 7/26/2022    Pre-Op Diagnosis: M17.11 RIGHT KNEE OSTEOARTHRITIS    Post-Op Diagnosis: Same       Procedure(s):  RIGHT TOTAL KNEE ARTHROPLASTY-BLOCK NON-CONTINUOUS-AILEEN    Surgeon(s):  Chastity Swann MD    First Assistant: Queenie Engel PA-C  The PA was integral to the completion of the surgery efficiently and safely. He assisted with positioning, exposure, application of implants, wound closure and dressings. Assistant:   Surgical Assistant: Molly Johnston    Anesthesia: General    Estimated Blood Loss (mL): less than 50     Complications: None    Specimens:   * No specimens in log *    Implants:  Implant Name Type Inv. Item Serial No.  Lot No. LRB No. Used Action   CEMENT BNE 40GM HI VISC RADPQ FOR REV SURG - [id number]  CEMENT BNE 40GM HI VISC RADPQ FOR REV SURG  [employer]- [id number] Right 1 Implanted   CEMENT BNE 40GM HI VISC RADPQ FOR REV SURG - [id number]  CEMENT BNE 40GM HI VISC RADPQ FOR REV SURG  [employer]- [id number] Right 1 Implanted   PSN TIB STM 5 DEG SZ H R - [id number]  PSN TIB STM 5 DEG SZ H R  [employer]- [id number] Right 1 Implanted   COMPONENT PAT [id number] THK9MM STD KNEE VIVACIT-E BISI PERSONA - [id number]  COMPONENT PAT [id number] THK9MM STD KNEE VIVACIT-E BISI PERSONA  [employer]- [id number] Right 1 Implanted   COMPONENT FEM SZ 11 STD R KNEE CO CHROME CRUCE RET BISI PERSO - [id number]  COMPONENT FEM SZ 11 STD R KNEE CO CHROME CRUCE RET BISI PERSO  [employer]- [id number] Right 1 Implanted   PSN MC VE ASF R 14MM 8-11 GH - [id number]  PSN MC VE ASF R 14MM 8-11 GH  [employer]- [id number] Right 1 Implanted         Drains: * No LDAs found *    Findings: Severe varus deformity of about 10 degrees.   Large osteophytes at all 3 compartments with severe medial compartment wear causing varus deformity. Excellent stability with the 14 spacer in place and a medial release and a balanced cut at the femur and tibia using alignment guides. Full range of motion 0-130 degrees with good patella tracking at completion. Detailed Description of Procedure:   Implant specifics Peter persona total knee system size 11 cruciate retaining femur, size H tibia size 35 patella and size 14 medial congruent spacer    Patient was placed under regional block in the holding area. Then placed under general anesthesia supine. Routine positioning for total knee with a tourniquet in the Pina leg bianchi utilized. Ioban placed. Midline incision made and the medial patellar parapatellar arthrotomy was made. Bone spurs resected and synovectomy performed. Patella everted and resected with a saw taking 10 mm. 35 measured and 3 lug holes made. The knee was then flexed and the proximal tibia was resected with retractors placed around the tibia. A medial release with a Singleton was performed first.  I resected the tibia with a soft taking 2 mm from the low medial side. I then resected the distal femur using the 5 degree valgus alignment for a right knee. I then measured the space and found to be well balanced. Posterior osteophytes were removed with the osteotome. The femur was finished and the tibial fin was cut trials were placed cement was then mixed and the final implants were placed. All excess cement was removed. a thorough irrigation with Irrisept and pulse lavage. The orthopedic cocktail was then injected into the posterior and medial deep soft tissues followed by injection into the superficial soft tissues anterior lateral and medial.    A capsule closure with interrupted #2 Ethibond was performed . Further capsular closure was placed with the running strata fix #1 suture.   Further closure of the subcutaneous tissue was closed with #0 Vicryl followed by a running 3-0 strata fix and the 4-0 Monocryl followed by Clancy Gitelman and a Mepilex dressing.     Electronically signed by Brianne Garcias MD on 7/26/2022 at 8:50 AM

## 2022-07-26 NOTE — PROGRESS NOTES
Pt arrived from OR to PACU bay 8. Reported received from [address] staff. Pt is arousable to voice. Surgical incisions dressings in place to right knee. Pt on 6L simple mask, NSR/paced, and VSS. Will continue to monitor.

## 2022-07-26 NOTE — PROGRESS NOTES
Discharge instructions review with patient and son. All home medications have been reviewed, pt v/u. Discharge instructions signed. Pt discharged via wheelchair. Pt discharged with all belongings. Son is taking stable pt home.

## 2022-07-26 NOTE — H&P
Date of Surgery Update:  Delfino Sandovla was seen, history and physical examination reviewed, and patient examined by me today. There have been no significant clinical changes since the completion of the previous history and physical.    The risk, benefits, and alternatives of the proposed procedure have been explained to the patient (or appropriate guardian) and understanding verbalized. All questions answered. Patient wishes to proceed.     Electronically signed by: Anjel Huffman MD,7/26/2022,7:11 AM

## 2022-07-26 NOTE — PROGRESS NOTES
[facility]   Phone: [phone]    Occupational Therapy    [x] Initial Evaluation            [] Daily Treatment Note         [] Discharge Summary      Patient: Fe Maradiaga   : 1940   MRN: [id number]   Date of Service:  2022    Admitting Diagnosis:  <principal problem not specified>  Current Admission Summary: RIGHT TOTAL KNEE ARTHROPLASTY   Past Medical History:  has a past medical history of Aortic aneurysm ([address] Atrial fibrillation ([address] Hyperlipidemia, RADHA on CPAP, Osteoporosis, Self-catheterizes urinary bladder, and Unspecified sleep apnea. Past Surgical History:  has a past surgical history that includes Bunionectomy (Left); Colonoscopy (); other surgical history; Prostate surgery; Cystocopy (); joint replacement (Bilateral); pacemaker placement; Thoracic aortic aneurysm repair; and Total knee arthroplasty (Right, 2022). Discharge Recommendations: Fe Maradiaga scored a 20/24 on the AM-PAC ADL Inpatient form. Current research shows that an AM-PAC score of 18 or greater is typically associated with a discharge to the patient's home setting. Based on the patient's AM-PAC score, and their current ADL deficits, it is recommended that the patient have 2-3 sessions per week of Occupational Therapy at d/c to increase the patient's independence. At this time, this patient demonstrates the endurance and safety to discharge home with home health (home vs OP services) and a follow up treatment frequency of 2-3x/wk. Please see assessment section for further patient specific details. If patient discharges prior to next session this note will serve as a discharge summary. Please see below for the latest assessment towards goals.       HOME HEALTH CARE: LEVEL 1 STANDARD    - Initial home health evaluation to occur within 24-48 hours, in patient home   - Therapy to evaluate with goal of regaining prior level of functioning   - Therapy to evaluate if patient has [address] needs for personal care      DME Required For Discharge: no DME required at discharge    Precautions/Restrictions: medium fall risk, up as tolerated  Weight Bearing Restrictions: weight bearing as tolerated     Required Braces/Orthotics: no braces required  Positional Restrictions:no positional restrictions    Pre-Admission Information   Lives With: spouse    Type of Home: house  Home Layout: two level, able to live on main level, . Comment: basement  Home Access:  2 step to enter with handrail. Handrails are located on Right side. Bathroom Layout: walker accessible, wheelchair accessible, walk in shower  Toilet Height: elevated height  Bathroom Equipment: shower chair  Home Equipment: rolling walker, single point cane, lift chair, reacher, sock aide  Transfer Assistance: Independent without use of device  Ambulation Assistance:modified independent with use of cane if pain is manageable, walker for community  ADL Assistance: independent with all ADL's  IADL Assistance: independent with homemaking tasks  Active :        [x] Yes  [] No  Hand Dominance: [] Left  [] Right  Current Employment: retired. Hobbies: pt is a primary caregiver for his wife  Recent Falls: 1, two weeks ago  Comment: Two sons to provide assistance, pt's wife has dementia, pt is the primary caregiver    Examination   Vision:   Vision Gross Assessment: WFL  Hearing:   WFL  Observation:   General Observation:  Pt with ACE wrap on RLE  Posture:   WFL  Sensation:   WFL  ROM:   (B) UE AROM WFL  Strength:   (B) UE strength grossly WFL    Decision Making: low complexity  Clinical Presentation: stable      Subjective  General: Pt supine in bed with son present. Pt agreeable to OT/PT co-tx and eval.  Pain: 5/10. Location: Knee  Pain Interventions: patient denies pain interventions        Activities of Daily Living  Basic Activities of Daily Living  Upper Extremity Dressing: stand by assistance. Equipment: none  Lower Extremity Dressing: maximum assistance. Equipment: none  Dressing Comments: pt completed dressing task while sitting on EOB, standing PRN for LB dressing with CGA for standing balance, pt required A to thread pants and briefs over legs, pt able to pull up pants over hips  Comments: Pt educated on use of reacher and sock aide for LB dressing tasks, pt is receptive and agreeable to use these. Instrumental Activities of Daily Living  No IADL completed on this date. Functional Mobility  Bed Mobility  Supine to Sit: minimal assistance  Comments: HOB elevated, A for RLE  Transfers  Sit to stand transfer:contact guard assistance  Stand to sit transfer: contact guard assistance  Comments: from EOB  Functional Mobility:  Sitting Balance: supervision. Standing Balance: contact guard assistance. Functional Mobility: rolling walker. contact guard assistance. Activity: 60 ft 3x    Other Therapeutic Interventions  Pt completed two steps with CGA and RW with min verbal cues for sequencing, good carryover of learned information    Functional Outcomes  AM-PAC Inpatient Daily Activity Raw Score: 20    Cognition  WFL  Orientation:    alert and oriented x 4  Command Following:   [facility]     Education  Barriers To Learning: none  Patient Education: patient educated on precautions, ADL adaptive strategies, family education, transfer training, discharge recommendations  Learning Assessment:  patient verbalizes and demonstrates understanding    Assessment  Activity Tolerance: Pt tolerated OT/PT well. Impairments Requiring Therapeutic Intervention: decreased functional mobility, decreased ADL status, decreased endurance, decreased balance, decreased IADL, increased pain  Prognosis: good  Clinical Assessment: [place] to admission, the pt lived with his wife in a two story home. Pt reported he was independent with ADLs and IADLs and is the primary caregiver for his wife, who has dementia.  Pt presents with the above deficits impacting his occupational performance and placing him below his baseline level of function. Pt will have 24/7 assist and SUP upon d/c home. Skilled OT services indicated using home health in order to maximize his independence and safety with all occupational pursuits.      Safety Interventions: patient left in bed, nurse notified, and family/caregiver present    Plan  Frequency: 7 x/week  Current Treatment Recommendations: strengthening, functional mobility training, transfer training, ADL/self-care training, IADL training, home management training, pain management, home exercise program, safety education, and equipment evaluation/education    Goals  Patient Goals: none stated   Short Term Goals:  Time Frame: upon d/c  Patient will complete upper body ADL at modified independent   Patient will complete lower body ADL at modified independent   Patient will complete functional transfers at [facility] independent   Patient will complete functional mobility at modified independent     Therapy Session Time     Individual Group Co-treatment   Time In    1121   Time Out    1205   Minutes    44        Timed Code Treatment Minutes:  Timed Code Treatment Minutes: 29 Minutes    Total Treatment Minutes:  44     Electronically Signed By: Prashant Snow OTR/L, [id number]

## 2022-07-26 NOTE — ANESTHESIA PRE PROCEDURE
Department of Anesthesiology  Preprocedure Note       Name:  Vicente Moreno   Age:  79 y.o.  :  1940                                          MRN:  [id number]         Date:  2022      Surgeon: Gayle Signs):  Noé Sutherland MD    Procedure: Procedure(s):  RIGHT TOTAL KNEE ARTHROPLASTY-BLOCK NON-CONTINUOUS-AILEEN    Medications prior to admission:   Prior to Admission medications    Medication Sig Start Date End Date Taking? Authorizing Provider   Enoxaparin Sodium (LOVENOX SC) Inject into the skin 2 times daily 22  Yes Historical Provider, MD   allopurinol (ZYLOPRIM) 100 MG tablet Take 100 mg by mouth daily   Yes Historical Provider, MD   CRANBERRY EXTRACT PO Take by mouth as needed If UTI   Yes Historical Provider, MD   warfarin (COUMADIN) 5 MG tablet TAKE 1 TABLET BY MOUTH EVERY DAY 16   Susan Werner DO   carvedilol (COREG) 12.5 MG tablet  3/3/16   Aidan Edgewood   torsemide (DEMADEX) 20 MG tablet TAKE 1 TABLET BY MOUTH DAILY 16   Jaimie Hull DO   fluticasone (FLONASE) 50 MCG/ACT nasal spray USE ONE SPRAY IN EACH NOSTRIL ONCE DAILY 16   Susan Werner DO   warfarin (COUMADIN) 6 MG tablet Take 1 tablet by mouth daily 16   Jaimie Hull DO   alendronate (FOSAMAX) 70 MG tablet TAKE 1 TABLET EVERY WEEK 16   Shola Hull DO   Psyllium (METAMUCIL PO) Take 30 mg by mouth as needed    Historical Provider, MD   tamsulosin (FLOMAX) 0.4 MG capsule Take 0.4 mg by mouth daily. 3/13/13   Historical Provider, MD   NITROSTAT 0.4 MG SL tablet Place 0.4 mg under the tongue every 5 minutes as needed. 13   Historical Provider, MD   lisinopril (PRINIVIL;ZESTRIL) 2.5 MG tablet Take  by mouth daily. 13   Historical Provider, MD   Omega-3 Fatty Acids (FISH OIL) 1200 MG CAPS Take 1,200 mg by mouth daily. Historical Provider, MD   Calcium Carbonate (CALTRATE 600 PO) Take 600 mg by mouth daily.     Historical Provider, MD       Current medications:    Current Facility-Administered Medications Use Topics    Alcohol use: No                                Counseling given: Not Answered      Vital Signs (Current):   Vitals:    07/12/22 1015 07/26/22 0619   BP:  120/70   Pulse:  66   Resp:  22   Temp:  96.9 °F (36.1 °C)   TempSrc:  Temporal   SpO2:  96%   Weight: 243 lb (110.2 kg) 240 lb (108.9 kg)   Height: 5' 10\" (1.778 m) 5' 10\" (1.778 m)                                              BP Readings from Last 3 Encounters:   07/26/22 120/70   06/10/16 110/68   04/26/16 110/74       NPO Status: Time of last liquid consumption: 0000                        Time of last solid consumption: 0000                        Date of last liquid consumption: 07/26/22                        Date of last solid food consumption: 07/26/22    BMI:   Wt Readings from Last 3 Encounters:   07/26/22 240 lb (108.9 kg)   06/10/16 247 lb 12.8 oz (112.4 kg)   06/07/16 250 lb (113.4 kg)     Body mass index is 34.44 kg/m².     CBC:   Lab Results   Component Value Date/Time    WBC 7.6 07/14/2022 09:40 AM    RBC 4.31 07/14/2022 09:40 AM    HGB 14.3 07/14/2022 09:40 AM    HCT 42.9 07/14/2022 09:40 AM    MCV 99.5 07/14/2022 09:40 AM    RDW 15.0 07/14/2022 09:40 AM     07/14/2022 09:40 AM       CMP:   Lab Results   Component Value Date/Time     07/14/2022 09:40 AM    K 4.6 07/14/2022 09:40 AM     07/14/2022 09:40 AM    CO2 29 07/14/2022 09:40 AM    BUN 20 07/14/2022 09:40 AM    CREATININE 0.9 07/14/2022 09:40 AM    GFRAA >60 07/14/2022 09:40 AM    LABGLOM >60 07/14/2022 09:40 AM    GLUCOSE 85 07/14/2022 09:40 AM    PROT 6.5 06/07/2016 01:52 PM    CALCIUM 9.3 07/14/2022 09:40 AM    BILITOT 0.6 06/07/2016 01:52 PM    ALKPHOS 66 06/07/2016 01:52 PM    AST 17 06/07/2016 01:52 PM    ALT 9 06/07/2016 01:52 PM       POC Tests:   Recent Labs     07/26/22  0631   POCGLU 94       Coags:   Lab Results   Component Value Date/Time    PROTIME 15.8 07/26/2022 06:20 AM    PROTIME 2.3 11/13/2015 09:00 AM    INR 1.26 07/26/2022 06:20 AM APTT 41.1 07/26/2022 06:20 AM       HCG (If Applicable): No results found for: PREGTESTUR, PREGSERUM, HCG, HCGQUANT     ABGs: No results found for: PHART, PO2ART, PWS7EMF, OWX6LLA, BEART, R6RZMRMI     Type & Screen (If Applicable):  Lab Results   Component Value Date    LABABO O%POS^^POSITIVE 08/01/2014       Drug/Infectious Status (If Applicable):  No results found for: HIV, HEPCAB    COVID-19 Screening (If Applicable): No results found for: COVID19        Anesthesia Evaluation  Patient summary reviewed and Nursing notes reviewed no history of anesthetic complications:   Airway: Mallampati: I  TM distance: >3 FB   Neck ROM: full  Mouth opening: > = 3 FB   Dental: normal exam         Pulmonary:   (+) sleep apnea: on CPAP,      (-) asthma and shortness of breath                           Cardiovascular:    (+) pacemaker: AICD and pacemaker, dysrhythmias: atrial fibrillation and SVT, CHF: systolic, hyperlipidemia    (-) hypertension and  angina             ROS comment: S/p thoracic AA repair w/ maze     Neuro/Psych:      (-) CVA           GI/Hepatic/Renal:        (-) GERD and liver disease       Endo/Other:        (-) diabetes mellitus, hypothyroidism               Abdominal:             Vascular:   + DVT, .  - PVD. Other Findings:           Anesthesia Plan      general and regional     ASA 3     (Pt consented for adductor canal nerve block for post-operative pain control. Discussed risks/benefits of procedure, including bleeding/infection, nerve injury, LAST. Pt understood and expressed understanding to continue.)  Induction: intravenous. MIPS: Postoperative opioids intended and Prophylactic antiemetics administered. Anesthetic plan and risks discussed with patient. Use of blood products discussed with patient whom. Plan discussed with CRNA.                     Reema Salazar MD   7/26/2022

## 2022-07-26 NOTE — DISCHARGE INSTR - PHARMACY
MEDICATIONS-  FOLLOW DIRECTIONS WRITTEN ON BOTTLE. *Cephalexin-This is an antibiotic, it is important to take all doses to prevent infection. Read bottle for dosing instructions. *Colace-This is to help with constipation. Please take regularly, especially if you are taking your narcotic pain medications. Stop if you are having diarrhea. Please also drink fluids and take with fiber foods. *Acetaminophen-take two extra strength tylenol (500 mg each) every 6 hours or 4 times a day. Please do not to exceed 4,000 mg of acetaminophen during a 24 hour period. Please keep in mind that acetaminophen can also be found in many over-the-counter cold medications as well as narcotics that may be given for pain. Oxycodone-take as needed for severe pain. This is a narcotic. Please take with food to avoid an upset stomach, Driving and alcohol should be avoided when taking this medication. This drug may cause constipation, you may take an over the counter medication stool softener. This medication can be addicting so take this medication for the shortest time possible. Take lowest dose possible. Phenergan-Take as needed for nausea only. This might make you sleepy, ok to break in half dose to lessen side effect. Take 15 minutes before pain pill if they make you nauseous. Omeprazole-This is to protect your stomach lining from your blood thinner and/or antiinflammatory. Please take 30 minutes before your first meal.    Cyclobenzaprine-This is a muscle relaxer, do NOT plan to drive with this medication. Be cautious when taking with a narcotic like oxycodone or norco as it can slowed breathing and sudden death. Start Warfarin Tonight    Stop cymbalta and gabapentin.

## 2022-07-26 NOTE — PROGRESS NOTES
Patient alert and oriented. Reviewed discharge, follow up, and medication instructions with patient and family member. Patient states understanding. Educated patient  to wear mckenna hose for 2 weeks and to remove at night and use incentive spirometer and take all medication as directed. Patient given instructions that due to state law, pain medication was written for 7 days and cannot be filled early. Instructed patient to take small dose of narcotic as possible and call the office if refill is needed after 7 days. Prescription information given patient and/or family. Waiting on iv abx and a successful therapy evaluation before discharge.      Updated LEWIS Tinoco

## 2022-07-26 NOTE — ANESTHESIA PROCEDURE NOTES
Peripheral Block    Patient location during procedure: pre-op  Reason for block: post-op pain management and at surgeon's request  Start time: 7/26/2022 7:11 AM  End time: 7/26/2022 7:13 AM  Staffing  Performed: resident/CRNA   Anesthesiologist: Brown Renee MD  Resident/CRNA: CLEMENT Bernstein CRNA  Preanesthetic Checklist  Completed: patient identified, IV checked, site marked, risks and benefits discussed, surgical/procedural consents, equipment checked, pre-op evaluation, timeout performed, anesthesia consent given, oxygen available and monitors applied/VS acknowledged  Peripheral Block   Patient position: supine  Prep: ChloraPrep  Provider prep: mask and sterile gloves  Patient monitoring: cardiac monitor, continuous pulse ox, frequent blood pressure checks and IV access  Block type: Femoral  Adductor canal (Low Femoral)  Laterality: right  Injection technique: single-shot  Guidance: ultrasound guided  Local infiltration: lidocaine  Infiltration strength: 1 %  Local infiltration: lidocaine  Dose: 3 mL    Needle   Needle type: long-bevel   Needle gauge: 20 G  Needle localization: ultrasound guidance  Needle length: 10 cm  Assessment   Injection assessment: negative aspiration for heme, no paresthesia on injection and local visualized surrounding nerve on ultrasound  Paresthesia pain: none  Slow fractionated injection: yes  Hemodynamics: stable  Real-time US image taken/store: yes  Outcomes: patient tolerated procedure well and uncomplicated    Additional Notes  U/S 38001. (1) Under ultrasound guidance, a 20 gauge needle was inserted and placed in close proximity to the saph nerve. (2) Ultrasound was also used to visualize the spread of the anesthetic in close proximity to the nerve being blocked. (3) The nerve appeared anatomically normal, and (4 there were no apparent abnormal pathological findings on the image that were readily visible and related to the nerve being blocked.  (5) A permanent ultrasound image was saved in the patient's record.             Medications Administered  bupivacaine (PF) 0.5 % - Perineural   20 mL - 7/26/2022 7:11:00 AM

## 2022-07-26 NOTE — PROGRESS NOTES
Right Lower Extremity  [] Left Lower Extremity     Required Braces/Orthotics: no braces required   [] Right  [] Left  Positional Restrictions:no positional restrictions    Pre-Admission Information   Lives With: spouse                  Type of Home: house  Home Layout: two level, able to live on main level, . Comment: basement  Home Access:  2 step to enter with handrail. Handrails are located on Right side. Bathroom Layout: walker accessible, wheelchair accessible, walk in shower  Toilet Height: elevated height  Bathroom Equipment: shower chair  Home Equipment: rolling walker, single point cane, reacher  Transfer Assistance: Independent without use of device  Ambulation Assistance:modified independent with use of cane if pain is manageable, walker for community  ADL Assistance: independent with all ADL's  IADL Assistance: independent with homemaking tasks  Active :        [x] Yes                 [] No  Hand Dominance: [] Left                 [] Right  Current Employment: retired. Hobbies: pt is a primary caregiver for his wife  Recent Falls: 1, two weeks ago  Comment: Two sons to provide assistance, pt's wife has dementia, pt is the primary caregiver  ______________________________________________________________________________  Examination  Vision:   Vision Gross Assessment: WFL  Hearing:   WFL  Observation:   General Observation:  Pt semi-reclined in bed upon therapist arrival.   Posture: Forward head rounded shoulders. Sensation:   denies numbness and tingling  ROM:   Decreased R knee extension/flexion  Strength:   (B) LE strength grossly WFL  Decision Making: low complexity  Clinical Presentation: stable      Subjective  General: Pt semi-reclined in bed upon therapist arrival. Son present. Pt agreeable to PT/OT evaluation. Pain: 4/10.   Location: R knee  Pain Interventions: pain medication in place prior to arrival       Functional Mobility  Bed Mobility  Supine to Sit: minimal assistance  Comments: HOB elevated, min A provided at R LE. Transfers  Sit to stand transfer: contact guard assistance  Stand to sit transfer: contact guard assistance  Comments: Decreased speed, pt cued for hand placement  Ambulation  Surface:level surface  Assistive Device: rolling walker  Assistance: contact guard assistance  Distance: 60 feet x 3 laps  Gait Mechanics: Antalgic gait, decreased terminal knee extension, decreased knee flexion in swing  Comments: Pt cued to bend and straighten R LE    Stair Mobility  Number of Steps: 2  Device: rolling walker  Assistance: contact guard assistance  Comments: Pt instructed to \"go up with the good, down with the bad\", decreased speed, decreased TKE on L LE  Wheelchair Mobility:  No w/c mobility completed on this date. Comments:  Balance  Static Sitting Balance: good: independent with functional balance in unsupported position  Static Standing Balance: fair (-): maintains balance at CGA with use of UE support  Dynamic Standing Balance: fair (-): maintains balance at CGA with use of UE support  Comments:    Other Therapeutic Interventions  ADLs - see OT note    Functional Outcomes  AM-PAC Inpatient Mobility Raw Score : 18              Cognition  WFL  Orientation:    alert and oriented x 4  Command Following:   [employer]    Education  Barriers To Learning: none  Patient Education: patient educated on goals, PT role and benefits, plan of care, precautions, general safety, proper use of assistive device/equipment, transfer training, discharge recommendations  Learning Assessment:  patient verbalizes and demonstrates understanding    Assessment  Activity Tolerance: Pt demonstrates appropriate tolerance to activity, ambulating 60 feet 3x limited secondary to discomfort in surgical LE.    Impairments Requiring Therapeutic Intervention: decreased functional mobility, decreased ROM, decreased strength, increased pain, decreased posture  Prognosis: good  Clinical Assessment: Pt is an 79 y.o male who presents with the above impairments. Pt tolerated interventions well, ambulating 60 ft x3 without significant increase in pain. Pt educated on completing car transfers and stairs effectively without increasing pain, as well as importance of activity and ambulating in recovery after TKA. Pt presents below baseline at this time, requiring skilled physical therapy to address decreased ROM and strength. At this time, outpatient physical therapy is recommended to address these impairments and optimize function. Safety Interventions: patient left in bed, gait belt, nurse notified, and family/caregiver present    Plan  Frequency: 7 x/week  Current Treatment Recommendations: strengthening, ROM, balance training, functional mobility training, transfer training, gait training, stair training, patient/caregiver education, pain management, and home exercise program    Goals  Patient Goals: None stated    Short Term Goals:  Time Frame: Prior to D/C  Patient will complete transfers at [facility]   Patient will ambulate 120 ft with use of rolling walker at modified independent  Patient will ascend/descend 2 stairs without use of HR at [facility]  Patient will complete car transfer at [facility]    Therapy Session Time      Individual Group Co-treatment   Time In     1121   Time Out     1205   Minutes     44     Billed units split with OT due to pt in OP surgery status    Timed Code Treatment Minutes:  15 Minutes  Total Treatment Minutes:  DOROTHY Canas  PT providing direct supervision during session and assisting in making skilled judgements throughout session.     Electronically Signed By: Caridad Connor, IVY Connor, PT, DPT, [id number]

## 2022-07-27 ENCOUNTER — TELEPHONE (OUTPATIENT)
Dept: INPATIENT UNIT | Age: 82
End: 2022-07-27

## 2022-07-27 NOTE — TELEPHONE ENCOUNTER
Spoke with Patient regarding post discharge from hospital. Walking every hour and doing exercises. Son with him. Feels great, no pain. Incision status: none drainage. no odor or redness noted per patient    Edema/Swelling:  :none       Wearing Teds: yes    Pain level and status: none   Use of pain medications appropriately: Yes. Taking tylenol only. Reminded patient due to state law, we will be unable to refill pain pills early, so takes smallest dose possible and call the office if prescription refill is needed past 7 days. Use of ice therapy: Yes     Blood thinner: WARFARIN ; Verified with patient that they are taking their anticoagulant as prescribed 1 a day. Bowels: Yes     Outpatient therapy: yes    Do you have all of your medications: Yes    Changes in medications: no    Using Incentive Spirometer?: Yes    Follow up Appointment Made: yes    Rate Patient Satisfaction Questions:1-5 (5 very good, 4 good, 3 fair, 2 poor, 1 very poor)    Rate your overall care during your surgical experience: 5    Rate how well you felt you were kept informed: 5    Comments: only comment is the visitor OR board was delayed with where he was. Answered questions or concerns about  the nerve block    Reminded patient that they will be getting a survey in the mail and we appreciate their feedback and taking the time to answer all the questions and return. No other questions/concerns at this time. Follow up appointment confirmed. Encouraged patient to call Orthopedic Nurse Eleonora Barakat (#[phone]) or Orthopedic office if has any questions/concerns. Follow up appointments:    No future appointments.      Electronically signed by Kristen Gottlieb RN on 7/27/2022 at 10:20 AM

## 2022-07-28 ENCOUNTER — TELEPHONE (OUTPATIENT)
Dept: INPATIENT UNIT | Age: 82
End: 2022-07-28

## 2022-08-01 ENCOUNTER — CARE COORDINATION (OUTPATIENT)
Dept: CASE MANAGEMENT | Age: 82
End: 2022-08-01

## 2022-08-03 ENCOUNTER — TELEPHONE (OUTPATIENT)
Dept: INPATIENT UNIT | Age: 82
End: 2022-08-03

## 2022-08-03 NOTE — TELEPHONE ENCOUNTER
S/p Right TKA with Dr. Peter Greenfield on 7/26. Son Britney. Patient's surgical leg is progressing well in therapy. However left knee is getting weaker and he fell because of the pain this am   Son states he caught him and patient sustained no injuries but wasn't sure what to do next for his Dad. Asked questions about steroid injections. Encourage patient's son to call Dr. Jayshree oHran for further advise and schedule appt. Son states understanding.

## 2022-08-08 ENCOUNTER — CARE COORDINATION (OUTPATIENT)
Dept: CASE MANAGEMENT | Age: 82
End: 2022-08-08

## 2022-08-08 NOTE — CARE COORDINATION
Spoke with patient. Incision status: States having a small amount of drainage. States will be seeing physical therapy this afternoon and will ask therapy to look at incision. States it has been doing this for a few days. States no redness present. Edema/Swelling: States swelling has improved some, continues to wear pumps and icing. Pain level and status: States pain is tolerable. Use of pain medications: States taking tylenol for pain relief. Bowels: No complaints. Outpatient therapy: Continues and states going well. Do you have all of your medications: Yes    Changes in medications: Yes, states INR was checked last week and was 2.1 and will continue with coumadin 4mg everyday. Next INR to be checked is 6 weeks. Follow up appointments:    No future appointments. Geovanni rico

## 2022-08-15 ENCOUNTER — CARE COORDINATION (OUTPATIENT)
Dept: CASE MANAGEMENT | Age: 82
End: 2022-08-15

## 2022-08-15 NOTE — CARE COORDINATION
Spoke with patient. Incision status: States scabbing present, but free from redness or drainage. Edema/Swelling: States swelling has improved. Pain level and status: States pain is tolerable. Use of pain medications: States not taking anything for pain meds. Bowels: No complaints. Outpatient therapy: Continues. Do you have all of your medications: Yes    Changes in medications: No    Follow up appointments:    No future appointments. Harrison rico

## 2022-08-22 ENCOUNTER — CARE COORDINATION (OUTPATIENT)
Dept: CASE MANAGEMENT | Age: 82
End: 2022-08-22

## 2022-08-22 NOTE — CARE COORDINATION
Spoke with patient. Incision status: States free from redness or drainage. Edema/Swelling: States swelling has improved. Pain level and status: States not having pain. Use of pain medications: States not taking pain meds. Bowels: No complaints. Outpatient therapy: Continues. Do you have all of your medications: Yes    Changes in medications: No    Follow up appointments:    No future appointments.     Светлана Elias BSN  Care Transition Nurse for ortho bundle  [phone]

## 2022-08-29 ENCOUNTER — CARE COORDINATION (OUTPATIENT)
Dept: CASE MANAGEMENT | Age: 82
End: 2022-08-29

## 2022-08-29 NOTE — CARE COORDINATION
Spoke with patient. Incision status: States free from redness or drainage. Edema/Swelling: States no swelling present. Pain level and status: States not having any pain. Use of pain medications: States not taking pain meds. Bowels: No complaints. Outpatient therapy: Completed. Do you have all of your medications: Yes    Changes in medications: No    Follow up appointments:    No future appointments.   Savannah Ruiz BSN  Care Transition Nurse for ortho bundle  [phone]

## 2022-09-06 ENCOUNTER — CARE COORDINATION (OUTPATIENT)
Dept: CASE MANAGEMENT | Age: 82
End: 2022-09-06

## 2022-09-06 NOTE — CARE COORDINATION
Called patient for updates. Unable to leave a message, will continue to follow for further needs.   Claribel Campos BSN  Care Transition Nurse for ortho bundle  [phone]

## 2022-09-12 ENCOUNTER — CARE COORDINATION (OUTPATIENT)
Dept: CASE MANAGEMENT | Age: 82
End: 2022-09-12

## 2022-09-12 NOTE — CARE COORDINATION
Called patient for updates. Left message for return call.   Abisai Spence BSN  Care Transition Nurse for ortho bundle  [phone]

## 2022-09-26 ENCOUNTER — CARE COORDINATION (OUTPATIENT)
Dept: CASE MANAGEMENT | Age: 82
End: 2022-09-26

## 2022-09-26 NOTE — CARE COORDINATION
Called patient for updates. Left message for return call.   Claribel Campos BSN  Care Transition Nurse for ortho bundle  [phone]

## 2022-10-10 ENCOUNTER — CARE COORDINATION (OUTPATIENT)
Dept: CASE MANAGEMENT | Age: 82
End: 2022-10-10

## 2022-12-29 NOTE — PROGRESS NOTES
Name_______________________________________Printed:____________________  Date and time of surgery__ 0730______________________Arrival Time:_0600_______________   1. The instructions given regarding when and if a patient needs to stop oral intake prior to surgery varies. Follow the specific instructions you were given                  _x__Nothing to eat or to drink after Midnight the night before.                   ____Carbo loading or ERAS instructions will be given to select patients-if you have been given those instructions -please do the following                           The evening before your surgery after dinner before midnight drink 40 ounces of gatorade. If you are diabetic use sugar free. The morning of surgery drink 40 ounces of water. This needs to be finished 3 hours prior to your surgery start time. 2. Take the following pills with a small sip of water on the morning of surgery____coreg_______________________________________________                  Do not take blood pressure medications ending in pril or sartan the shruthi prior to surgery or the morning of surgery. Dr Marthenia Apgar patient are not to take any medications the AM of surgery. 3. Aspirin, Ibuprofen, Advil, Naproxen, Vitamin E and other Anti-inflammatory products and supplements should be stopped for 5 -7days before surgery or as directed by your physician. 4. Check with your Doctor regarding stopping Plavix, Coumadin,Eliquis, Lovenox,Effient,Pradaxa,Xarelto, Fragmin or other blood thinners and follow their instructions. To get instructions on Coumadin-thinks he will be bridged with Lovenox   5. Do not smoke, and do not drink any alcoholic beverages 24 hours prior to surgery. This includes NA Beer. Refrain from the usage of any recreational drugs. 6. You may brush your teeth and gargle the morning of surgery. DO NOT SWALLOW WATER   7.  You MUST make arrangements for a responsible adult to stay on site while you are here and take you home after your surgery. You will not be allowed to leave alone or drive yourself home. It is strongly suggested someone stay with you the first 24 hrs. Your surgery will be cancelled if you do not have a ride home. 8. A parent/legal guardian must accompany a child scheduled for surgery and plan to stay at the hospital until the child is discharged. Please do not bring other children with you. 9. Please wear simple, loose fitting clothing to the hospital.  Tre Found not bring valuables (money, credit cards, checkbooks, etc.) Do not wear any makeup (including no eye makeup) or nail polish on your fingers or toes. 10. DO NOT wear any jewelry or piercings on day of surgery. All body piercing jewelry must be removed. 11. If you have ___dentures, they will be removed before going to the OR; we will provide you a container. If you wear ___contact lenses or ___glasses, they will be removed; please bring a case for them. 12. Please see your family doctor/pediatrician for a history & physical and/or concerning medications. Bring any test results/reports from your physician's office. PCP__________________Phone___________H&P Appt. Date________             13 If you  have a Living Will and Durable Power of  for Healthcare, please bring in a copy. 15. Notify your Surgeon if you develop any illness between now and surgery  time, cough, cold, fever, sore throat, nausea, vomiting, etc.  Please notify your surgeon if you experience dizziness, shortness of breath or blurred vision between now & the time of your surgery             15. DO NOT shave your operative site 96 hours prior to surgery. For face & neck surgery, men may use an electric razor 48 hours prior to surgery. 16. Shower the night before or morning of surgery using an antibacterial soap or as you have been instructed.              17. To provide excellent care visitors will be limited to one in the room at any given time. 18.  Please bring picture ID and insurance card. 19.  Visit our web site for additional information:  [email or web address]              20.During flu season no children under the age of 15 are permitted in the hospital for the safety of all patients. 21. If you take a long acting insulin in the evening only  take half of your usual  dose the night  before your procedure              22. If you use a c-pap please bring DOS if staying overnight,             23.For your convenience [facility] has a pharmacy on site to fill your prescriptions. 24. If you use oxygen and have a portable tank please bring it  with you the DOS             25. Bring a complete list of all your medications with name and dose include any supplements. 26. Other__________________________________________   *Please call pre admission testing if you any further questions   Kevin Bullock     [id number]  [phone]   [address] [place] [address]       VISITOR POLICY(subject to change)    Current policy is 2 visitors per patient. No children. Mask is  at the discretion of the facility. Visiting hours are 8a-8p. Overnight visitors will be at the discretion of the nurse. All policies subject to change. All above information reviewed with patient in person or by phone. Patient verbalizes understanding. All questions and concerns addressed.                                                                                                  Patient/Rep___per phone/pt   phone number given to patient and stated son Lilo Wayne was free to call for any questions_________________                                                                                                                                    PRE OP INSTRUCTIONS

## 2022-12-29 NOTE — PROGRESS NOTES
The patient will attend class on__has folder will  soap_____________  The patent will get ordered PATs on__by 1/13/23_________________________________  The patient will see PCP within 30 days of scheduled surgery___1/17/23_______

## 2022-12-30 NOTE — DISCHARGE INSTRUCTIONS
[facility] is participating in [address] [place] [address] for Joint Replacement (CJR) [facility] is participating in a Medicare initiative called the [address] [place] [address] for Joint Replacement (CJR) model. Medicare designed this model to encourage higher quality care and greater financial accountability from hospitals when Medicare beneficiaries receive lower-extremity joint replacement procedures (Margaretann Conn), typically hip or knee replacements. [facility] participation in the [address] [place] [address],[address] [place] [address] should not restrict your access to care for your medical condition or your freedom to choose your health care providers and services. All existing Medicare beneficiary protections continue to be available to you. The CJR model aims to help give you better care. The CJR model aims to support better and more efficient care for beneficiaries undergoing LEJR procedures. A CJR episode of care is typically defined as an admission of an eligible Medicare beneficiary to a hospital participating in the [address] [place] [address] for an Margaretann Conn procedure. The LEJR procedure can take place in either an inpatient or outpatient setting and will still be considered a CJR episode of care. The CJR episode of care continues for 90 days following discharge. This model uses episode payment and quality measurement for an episode of care associated with LEJR procedures to encourage hospitals, physicians, and post-acute care providers to work together to improve the quality and coordination of care from the initial hospitalization through recovery. Under the [address] [place] [address],[address] [place] [address], [facility] can earn additional payments from Medicare if we meet the high quality goals set by Medicare, while keeping hospital costs and care spending under control. If we dont meet these quality and cost goals, we may have to repay Medicare.      Medicare is using the CJR model to encourage Debora Trevino [address] to work more closely with your doctors and other health care providers that help patients recover after discharge from the hospital including, but not limited to, nursing homes (skilled nursing facilities), home health agencies, inpatient rehabilitation facilities, and long- term care hospitals. If you require a stay in a [facility] (SNF) to assist with your recovery from surgery and if, and only if, it is clinically appropriate, the CJR model permits [facility] to discharge you to a high quality SNF sooner than the three days Medicare usually requires to cover a SNF stay. Medicare will monitor your care to ensure you and others are receiving high quality care. It's your choice which hospital, doctor, or other providers you use. You have the right to choose which hospital, doctor, or other post-hospital stay health care provider you use. If you believe that your care is adversely affected or have concerns about substandard care, you may call 1-800-MEDICARE or contact your states Quality Improvement Organization by going to: MYTRND.[employer]Love. To find a different doctor, visit [address] Physician Compare website, [employer], or call 1-800-MEDICARE (7-749.958.6542). TTY users should call 0-728.676.5419. To find a different hospital, visit [employer], or  call 1-800-MEDICARE (1- 370.321.9630). TTY users should call  5-886.335.6302. To find a different skilled nursing facility, visit [address] website, [email or web address]/, or call  1-800-MEDICARE (5-614.724.9947). TTY users should call 0-961-258- 5299. To find a different home health agency, visit [address] [place] [address] website, "[email or web address].co.uk, or call 1-800-MEDICARE (6-769.273.5475). TTY users should call 2-813-517- 0537. For an explanation of how patients can access their health care records and beneficiary claims data, please visit Helen.[email or web address]-button    Get more information     If you have questions or want more information about the Comprehensive Care for Joint Replacement (CJR) model, call [facility] at [phone] or call 1-800-MEDICARE. You can also find additional information at https://innovation.cms.gov/initiatives/cjr. TOTAL KNEE POSTOP INSTRUCTIONS    Follow up with Dr Pawel Yusuf in 2 weeks. Weight bearing: Full weight bearing as tolerated with use of a walker until comfortably cleared by PT    Blood clot prevention: Resume your Coumadin as ordered by your cardiologist.    Demetra Solders have been prescribed medications that were already sent to your pharmacy before surgery  Oxycodone (severe pain medication), acetaminophen (tylenol), promethazine (nausea), tizanidine(muscle relaxer), cefadroxil(antibiotic), colace (stool softener/ constipation)    You will start physical therapy immediately. Please call the office if you do not already have scehduled. Wear your leg continuous compressive devices several times daily when sedentary. Elevate and ice your leg frequently (at least 5 times daily). Be up and walking at least once per hour for at least 10 minutes. May remove external dressing after 3 days. There will be a glue strip that will remain covering your wound until your follow up visit which Dr. Pawel Yusuf will remove. May shower after 3 days, pat dry the wound. Call the office if any fevers, chills, leg numbness, severe calf swelling, purple/blue limb, or severe leg pain. Office phone number [phone] for questions or concerns. ANESTHESIA DISCHARGE INSTRUCTIONS    Wear your seatbelt home.   You are under the influence of drugs-do not drink alcohol, drive, operate machinery, make any important decisions or sign any legal documents for 24 hours. Children should not ride bikes, [place] or play on gym sets for 24 hours after surgery. A responsible adult needs to be with you for 24 hours. You may experience lightheadedness, dizziness, or sleepiness following surgery. Rest at home today- increase activity as tolerated. Progress slowly to a regular diet unless your physician has instructed you otherwise. Drink plenty of water. If persistent nausea and vomiting becomes a problem, call your physician. Coughing, sore throat and muscle aches are other side effects of anesthesia, and should improve with time. Do not drive or operate machinery while taking narcotics. Females of childbearing potential and on hormonal birth control, should use two forms of contraception following procedure if given a medication called sugammadex and/or emend. Additional contraception should be used for 7 days for sugammadex and/or 28 days for emend. These medications have a potential to reduce the effectiveness of hormonal birth control.

## 2023-01-10 NOTE — PROGRESS NOTES
Patients son Jeanine Polo called in-reviewed preop instructions with him-answered questions-voiced understanding-will give him a paper print out of instructions when come in for PATS

## 2023-01-13 ENCOUNTER — HOSPITAL ENCOUNTER (OUTPATIENT)
Age: 83
Discharge: HOME | End: 2023-01-13
Payer: MEDICARE

## 2023-01-13 DIAGNOSIS — Z01.818 PREOP TESTING: ICD-10-CM

## 2023-01-13 LAB
ABO/RH: NORMAL
ANION GAP SERPL CALCULATED.3IONS-SCNC: 10 MMOL/L (ref 3–16)
ANTIBODY SCREEN: NORMAL
APTT: 36.7 SEC (ref 23–34.3)
BACTERIA: ABNORMAL /HPF
BASOPHILS ABSOLUTE: 0.2 K/UL (ref 0–0.2)
BASOPHILS RELATIVE PERCENT: 2.8 %
BILIRUBIN URINE: NEGATIVE
BLOOD, URINE: ABNORMAL
BUN BLDV-MCNC: 18 MG/DL (ref 7–20)
CALCIUM SERPL-MCNC: 9 MG/DL (ref 8.3–10.6)
CHLORIDE BLD-SCNC: 101 MMOL/L (ref 99–110)
CLARITY: ABNORMAL
CO2: 27 MMOL/L (ref 21–32)
COLOR: YELLOW
CREAT SERPL-MCNC: 0.9 MG/DL (ref 0.8–1.3)
EOSINOPHILS ABSOLUTE: 0.1 K/UL (ref 0–0.6)
EOSINOPHILS RELATIVE PERCENT: 1 %
EPITHELIAL CELLS, UA: 0 /HPF (ref 0–5)
GFR SERPL CREATININE-BSD FRML MDRD: >60 ML/MIN/{1.73_M2}
GLUCOSE BLD-MCNC: 92 MG/DL (ref 70–99)
GLUCOSE URINE: NEGATIVE MG/DL
HCT VFR BLD CALC: 42.1 % (ref 40.5–52.5)
HEMOGLOBIN: 13.7 G/DL (ref 13.5–17.5)
HYALINE CASTS: 2 /LPF (ref 0–8)
INR BLD: 1.95 (ref 0.87–1.14)
KETONES, URINE: NEGATIVE MG/DL
LEUKOCYTE ESTERASE, URINE: ABNORMAL
LYMPHOCYTES ABSOLUTE: 1 K/UL (ref 1–5.1)
LYMPHOCYTES RELATIVE PERCENT: 14.1 %
MCH RBC QN AUTO: 31.1 PG (ref 26–34)
MCHC RBC AUTO-ENTMCNC: 32.4 G/DL (ref 31–36)
MCV RBC AUTO: 96 FL (ref 80–100)
MICROSCOPIC EXAMINATION: YES
MONOCYTES ABSOLUTE: 0.7 K/UL (ref 0–1.3)
MONOCYTES RELATIVE PERCENT: 9.1 %
NEUTROPHILS ABSOLUTE: 5.2 K/UL (ref 1.7–7.7)
NEUTROPHILS RELATIVE PERCENT: 73 %
NITRITE, URINE: POSITIVE
PDW BLD-RTO: 16.6 % (ref 12.4–15.4)
PH UA: 6 (ref 5–8)
PLATELET # BLD: 185 K/UL (ref 135–450)
PMV BLD AUTO: 8.5 FL (ref 5–10.5)
POTASSIUM SERPL-SCNC: 4.6 MMOL/L (ref 3.5–5.1)
PROTEIN UA: NEGATIVE MG/DL
PROTHROMBIN TIME: 22.3 SEC (ref 11.7–14.5)
RBC # BLD: 4.39 M/UL (ref 4.2–5.9)
RBC UA: 2 /HPF (ref 0–4)
SODIUM BLD-SCNC: 138 MMOL/L (ref 136–145)
SPECIFIC GRAVITY UA: 1.01 (ref 1–1.03)
URINE TYPE: ABNORMAL
UROBILINOGEN, URINE: 0.2 E.U./DL
WBC # BLD: 7.1 K/UL (ref 4–11)
WBC UA: 468 /HPF (ref 0–5)

## 2023-01-13 PROCEDURE — 86900 BLOOD TYPING SEROLOGIC ABO: CPT

## 2023-01-13 PROCEDURE — 86850 RBC ANTIBODY SCREEN: CPT

## 2023-01-13 PROCEDURE — 36415 COLL VENOUS BLD VENIPUNCTURE: CPT

## 2023-01-13 PROCEDURE — 85730 THROMBOPLASTIN TIME PARTIAL: CPT

## 2023-01-13 PROCEDURE — 80048 BASIC METABOLIC PNL TOTAL CA: CPT

## 2023-01-13 PROCEDURE — 87081 CULTURE SCREEN ONLY: CPT

## 2023-01-13 PROCEDURE — 87186 SC STD MICRODIL/AGAR DIL: CPT

## 2023-01-13 PROCEDURE — 86901 BLOOD TYPING SEROLOGIC RH(D): CPT

## 2023-01-13 PROCEDURE — 85610 PROTHROMBIN TIME: CPT

## 2023-01-13 PROCEDURE — 87086 URINE CULTURE/COLONY COUNT: CPT

## 2023-01-13 PROCEDURE — 83036 HEMOGLOBIN GLYCOSYLATED A1C: CPT

## 2023-01-13 PROCEDURE — 87088 URINE BACTERIA CULTURE: CPT

## 2023-01-13 PROCEDURE — 81001 URINALYSIS AUTO W/SCOPE: CPT

## 2023-01-13 PROCEDURE — 85025 COMPLETE CBC W/AUTO DIFF WBC: CPT

## 2023-01-13 NOTE — PROGRESS NOTES
Patient in to  soap/folder Clofazimine Counseling:  I discussed with the patient the risks of clofazimine including but not limited to skin and eye pigmentation, liver damage, nausea/vomiting, gastrointestinal bleeding and allergy.

## 2023-01-14 LAB
ESTIMATED AVERAGE GLUCOSE: 102.5 MG/DL
HBA1C MFR BLD: 5.2 %

## 2023-01-15 LAB
MRSA CULTURE ONLY: NORMAL
ORGANISM: ABNORMAL
URINE CULTURE, ROUTINE: ABNORMAL

## 2023-01-24 ENCOUNTER — APPOINTMENT (OUTPATIENT)
Dept: GENERAL RADIOLOGY | Age: 83
End: 2023-01-24
Attending: ORTHOPAEDIC SURGERY
Payer: MEDICARE

## 2023-01-24 ENCOUNTER — ANESTHESIA (OUTPATIENT)
Dept: OPERATING ROOM | Age: 83
End: 2023-01-24
Payer: MEDICARE

## 2023-01-24 ENCOUNTER — ANESTHESIA EVENT (OUTPATIENT)
Dept: OPERATING ROOM | Age: 83
End: 2023-01-24
Payer: MEDICARE

## 2023-01-24 ENCOUNTER — HOSPITAL ENCOUNTER (OUTPATIENT)
Age: 83
Discharge: HOME | End: 2023-01-24
Attending: ORTHOPAEDIC SURGERY | Admitting: ORTHOPAEDIC SURGERY
Payer: MEDICARE

## 2023-01-24 VITALS
BODY MASS INDEX: 33.17 KG/M2 | HEART RATE: 65 BPM | OXYGEN SATURATION: 99 % | DIASTOLIC BLOOD PRESSURE: 71 MMHG | WEIGHT: 231.7 LBS | RESPIRATION RATE: 16 BRPM | HEIGHT: 70 IN | SYSTOLIC BLOOD PRESSURE: 146 MMHG | TEMPERATURE: 97 F

## 2023-01-24 DIAGNOSIS — Z01.818 PREOP TESTING: Primary | ICD-10-CM

## 2023-01-24 PROBLEM — Z96.652 PRESENCE OF LEFT ARTIFICIAL KNEE JOINT: Status: ACTIVE | Noted: 2023-01-24

## 2023-01-24 PROBLEM — M17.12 OSTEOARTHRITIS OF LEFT KNEE: Status: ACTIVE | Noted: 2023-01-24

## 2023-01-24 LAB
ABO/RH: NORMAL
ANTIBODY SCREEN: NORMAL
APTT: 36.9 SEC (ref 23–34.3)
GLUCOSE BLD-MCNC: 92 MG/DL (ref 70–99)
INR BLD: 1.26 (ref 0.87–1.14)
PERFORMED ON: NORMAL
PROTHROMBIN TIME: 15.7 SEC (ref 11.7–14.5)

## 2023-01-24 PROCEDURE — 6360000002 HC RX W HCPCS: Performed by: ORTHOPAEDIC SURGERY

## 2023-01-24 PROCEDURE — 7100000010 HC PHASE II RECOVERY - FIRST 15 MIN: Performed by: ORTHOPAEDIC SURGERY

## 2023-01-24 PROCEDURE — 2709999900 HC NON-CHARGEABLE SUPPLY: Performed by: ORTHOPAEDIC SURGERY

## 2023-01-24 PROCEDURE — 6360000002 HC RX W HCPCS: Performed by: FAMILY MEDICINE

## 2023-01-24 PROCEDURE — 73560 X-RAY EXAM OF KNEE 1 OR 2: CPT

## 2023-01-24 PROCEDURE — 2500000003 HC RX 250 WO HCPCS: Performed by: ORTHOPAEDIC SURGERY

## 2023-01-24 PROCEDURE — 97165 OT EVAL LOW COMPLEX 30 MIN: CPT

## 2023-01-24 PROCEDURE — 86901 BLOOD TYPING SEROLOGIC RH(D): CPT

## 2023-01-24 PROCEDURE — 86850 RBC ANTIBODY SCREEN: CPT

## 2023-01-24 PROCEDURE — 7100000001 HC PACU RECOVERY - ADDTL 15 MIN: Performed by: ORTHOPAEDIC SURGERY

## 2023-01-24 PROCEDURE — 2720000010 HC SURG SUPPLY STERILE: Performed by: ORTHOPAEDIC SURGERY

## 2023-01-24 PROCEDURE — 36415 COLL VENOUS BLD VENIPUNCTURE: CPT

## 2023-01-24 PROCEDURE — 3600000004 HC SURGERY LEVEL 4 BASE: Performed by: ORTHOPAEDIC SURGERY

## 2023-01-24 PROCEDURE — 85730 THROMBOPLASTIN TIME PARTIAL: CPT

## 2023-01-24 PROCEDURE — 86900 BLOOD TYPING SEROLOGIC ABO: CPT

## 2023-01-24 PROCEDURE — 64447 NJX AA&/STRD FEMORAL NRV IMG: CPT | Performed by: FAMILY MEDICINE

## 2023-01-24 PROCEDURE — A4217 STERILE WATER/SALINE, 500 ML: HCPCS | Performed by: ORTHOPAEDIC SURGERY

## 2023-01-24 PROCEDURE — 85610 PROTHROMBIN TIME: CPT

## 2023-01-24 PROCEDURE — C1713 ANCHOR/SCREW BN/BN,TIS/BN: HCPCS | Performed by: ORTHOPAEDIC SURGERY

## 2023-01-24 PROCEDURE — 7100000000 HC PACU RECOVERY - FIRST 15 MIN: Performed by: ORTHOPAEDIC SURGERY

## 2023-01-24 PROCEDURE — 97535 SELF CARE MNGMENT TRAINING: CPT

## 2023-01-24 PROCEDURE — 6360000002 HC RX W HCPCS: Performed by: NURSE ANESTHETIST, CERTIFIED REGISTERED

## 2023-01-24 PROCEDURE — 97161 PT EVAL LOW COMPLEX 20 MIN: CPT

## 2023-01-24 PROCEDURE — 2500000003 HC RX 250 WO HCPCS: Performed by: FAMILY MEDICINE

## 2023-01-24 PROCEDURE — 2500000003 HC RX 250 WO HCPCS: Performed by: NURSE ANESTHETIST, CERTIFIED REGISTERED

## 2023-01-24 PROCEDURE — 97116 GAIT TRAINING THERAPY: CPT

## 2023-01-24 PROCEDURE — C1776 JOINT DEVICE (IMPLANTABLE): HCPCS | Performed by: ORTHOPAEDIC SURGERY

## 2023-01-24 PROCEDURE — 3700000000 HC ANESTHESIA ATTENDED CARE: Performed by: ORTHOPAEDIC SURGERY

## 2023-01-24 PROCEDURE — 3600000014 HC SURGERY LEVEL 4 ADDTL 15MIN: Performed by: ORTHOPAEDIC SURGERY

## 2023-01-24 PROCEDURE — 3700000001 HC ADD 15 MINUTES (ANESTHESIA): Performed by: ORTHOPAEDIC SURGERY

## 2023-01-24 PROCEDURE — 2580000003 HC RX 258: Performed by: ORTHOPAEDIC SURGERY

## 2023-01-24 PROCEDURE — 7100000011 HC PHASE II RECOVERY - ADDTL 15 MIN: Performed by: ORTHOPAEDIC SURGERY

## 2023-01-24 DEVICE — CEMENT BNE 40GM HI VISC RADPQ FOR REV SURG: Type: IMPLANTABLE DEVICE | Site: KNEE | Status: FUNCTIONAL

## 2023-01-24 DEVICE — DUP USE 339820 IMPL KNEE PSN TIB STM 5 DEG SZ G L: Type: IMPLANTABLE DEVICE | Site: KNEE | Status: FUNCTIONAL

## 2023-01-24 DEVICE — IMPLANTABLE DEVICE: Type: IMPLANTABLE DEVICE | Site: KNEE | Status: FUNCTIONAL

## 2023-01-24 DEVICE — COMPONENT PAT DIA35MM THK9MM STD KNEE VIVACIT-E CEM PERSONA: Type: IMPLANTABLE DEVICE | Site: KNEE | Status: FUNCTIONAL

## 2023-01-24 RX ORDER — LIDOCAINE HYDROCHLORIDE 20 MG/ML
INJECTION, SOLUTION EPIDURAL; INFILTRATION; INTRACAUDAL; PERINEURAL PRN
Status: DISCONTINUED | OUTPATIENT
Start: 2023-01-24 | End: 2023-01-24 | Stop reason: SDUPTHER

## 2023-01-24 RX ORDER — ACETAMINOPHEN 650 MG/1
650 SUPPOSITORY RECTAL
Status: DISCONTINUED | OUTPATIENT
Start: 2023-01-24 | End: 2023-01-24 | Stop reason: HOSPADM

## 2023-01-24 RX ORDER — SODIUM CHLORIDE 9 MG/ML
INJECTION, SOLUTION INTRAVENOUS CONTINUOUS
Status: DISCONTINUED | OUTPATIENT
Start: 2023-01-24 | End: 2023-01-24 | Stop reason: HOSPADM

## 2023-01-24 RX ORDER — ROCURONIUM BROMIDE 10 MG/ML
INJECTION, SOLUTION INTRAVENOUS PRN
Status: DISCONTINUED | OUTPATIENT
Start: 2023-01-24 | End: 2023-01-24 | Stop reason: SDUPTHER

## 2023-01-24 RX ORDER — PROMETHAZINE HYDROCHLORIDE 25 MG/1
12.5 TABLET ORAL EVERY 6 HOURS PRN
Status: DISCONTINUED | OUTPATIENT
Start: 2023-01-24 | End: 2023-01-24 | Stop reason: HOSPADM

## 2023-01-24 RX ORDER — LIDOCAINE HYDROCHLORIDE 10 MG/ML
0.5 INJECTION, SOLUTION EPIDURAL; INFILTRATION; INTRACAUDAL; PERINEURAL ONCE
Status: DISCONTINUED | OUTPATIENT
Start: 2023-01-24 | End: 2023-01-24 | Stop reason: HOSPADM

## 2023-01-24 RX ORDER — SODIUM CHLORIDE 0.9 % (FLUSH) 0.9 %
5-40 SYRINGE (ML) INJECTION PRN
Status: DISCONTINUED | OUTPATIENT
Start: 2023-01-24 | End: 2023-01-24 | Stop reason: HOSPADM

## 2023-01-24 RX ORDER — ONDANSETRON 2 MG/ML
4 INJECTION INTRAMUSCULAR; INTRAVENOUS
Status: DISCONTINUED | OUTPATIENT
Start: 2023-01-24 | End: 2023-01-24 | Stop reason: HOSPADM

## 2023-01-24 RX ORDER — MEPERIDINE HYDROCHLORIDE 25 MG/ML
12.5 INJECTION INTRAMUSCULAR; INTRAVENOUS; SUBCUTANEOUS EVERY 5 MIN PRN
Status: DISCONTINUED | OUTPATIENT
Start: 2023-01-24 | End: 2023-01-24 | Stop reason: HOSPADM

## 2023-01-24 RX ORDER — LORAZEPAM 2 MG/ML
0.5 INJECTION INTRAMUSCULAR
Status: DISCONTINUED | OUTPATIENT
Start: 2023-01-24 | End: 2023-01-24 | Stop reason: HOSPADM

## 2023-01-24 RX ORDER — SODIUM CHLORIDE 0.9 % (FLUSH) 0.9 %
5-40 SYRINGE (ML) INJECTION EVERY 12 HOURS SCHEDULED
Status: DISCONTINUED | OUTPATIENT
Start: 2023-01-24 | End: 2023-01-24 | Stop reason: HOSPADM

## 2023-01-24 RX ORDER — MAGNESIUM HYDROXIDE 1200 MG/15ML
LIQUID ORAL
Status: COMPLETED | OUTPATIENT
Start: 2023-01-24 | End: 2023-01-24

## 2023-01-24 RX ORDER — VANCOMYCIN HYDROCHLORIDE 1 G/20ML
INJECTION, POWDER, LYOPHILIZED, FOR SOLUTION INTRAVENOUS
Status: COMPLETED | OUTPATIENT
Start: 2023-01-24 | End: 2023-01-24

## 2023-01-24 RX ORDER — SODIUM CHLORIDE 9 MG/ML
25 INJECTION, SOLUTION INTRAVENOUS PRN
Status: DISCONTINUED | OUTPATIENT
Start: 2023-01-24 | End: 2023-01-24 | Stop reason: HOSPADM

## 2023-01-24 RX ORDER — CEFAZOLIN SODIUM 1 G/3ML
INJECTION, POWDER, FOR SOLUTION INTRAMUSCULAR; INTRAVENOUS PRN
Status: DISCONTINUED | OUTPATIENT
Start: 2023-01-24 | End: 2023-01-24 | Stop reason: SDUPTHER

## 2023-01-24 RX ORDER — ONDANSETRON 2 MG/ML
INJECTION INTRAMUSCULAR; INTRAVENOUS PRN
Status: DISCONTINUED | OUTPATIENT
Start: 2023-01-24 | End: 2023-01-24 | Stop reason: SDUPTHER

## 2023-01-24 RX ORDER — IPRATROPIUM BROMIDE AND ALBUTEROL SULFATE 2.5; .5 MG/3ML; MG/3ML
1 SOLUTION RESPIRATORY (INHALATION)
Status: DISCONTINUED | OUTPATIENT
Start: 2023-01-24 | End: 2023-01-24 | Stop reason: HOSPADM

## 2023-01-24 RX ORDER — BUPIVACAINE HYDROCHLORIDE 5 MG/ML
INJECTION, SOLUTION EPIDURAL; INTRACAUDAL
Status: COMPLETED | OUTPATIENT
Start: 2023-01-24 | End: 2023-01-24

## 2023-01-24 RX ORDER — DEXAMETHASONE SODIUM PHOSPHATE 4 MG/ML
INJECTION, SOLUTION INTRA-ARTICULAR; INTRALESIONAL; INTRAMUSCULAR; INTRAVENOUS; SOFT TISSUE PRN
Status: DISCONTINUED | OUTPATIENT
Start: 2023-01-24 | End: 2023-01-24 | Stop reason: SDUPTHER

## 2023-01-24 RX ORDER — PROPOFOL 10 MG/ML
INJECTION, EMULSION INTRAVENOUS PRN
Status: DISCONTINUED | OUTPATIENT
Start: 2023-01-24 | End: 2023-01-24 | Stop reason: SDUPTHER

## 2023-01-24 RX ORDER — MAGNESIUM HYDROXIDE 1200 MG/15ML
LIQUID ORAL CONTINUOUS PRN
Status: DISCONTINUED | OUTPATIENT
Start: 2023-01-24 | End: 2023-01-24 | Stop reason: HOSPADM

## 2023-01-24 RX ORDER — FENTANYL CITRATE 50 UG/ML
25 INJECTION, SOLUTION INTRAMUSCULAR; INTRAVENOUS EVERY 5 MIN PRN
Status: DISCONTINUED | OUTPATIENT
Start: 2023-01-24 | End: 2023-01-24 | Stop reason: HOSPADM

## 2023-01-24 RX ORDER — LABETALOL HYDROCHLORIDE 5 MG/ML
10 INJECTION, SOLUTION INTRAVENOUS
Status: DISCONTINUED | OUTPATIENT
Start: 2023-01-24 | End: 2023-01-24 | Stop reason: HOSPADM

## 2023-01-24 RX ORDER — FENTANYL CITRATE 50 UG/ML
INJECTION, SOLUTION INTRAMUSCULAR; INTRAVENOUS PRN
Status: DISCONTINUED | OUTPATIENT
Start: 2023-01-24 | End: 2023-01-24 | Stop reason: SDUPTHER

## 2023-01-24 RX ORDER — PROCHLORPERAZINE EDISYLATE 5 MG/ML
5 INJECTION INTRAMUSCULAR; INTRAVENOUS
Status: DISCONTINUED | OUTPATIENT
Start: 2023-01-24 | End: 2023-01-24 | Stop reason: HOSPADM

## 2023-01-24 RX ORDER — SODIUM CHLORIDE, SODIUM LACTATE, POTASSIUM CHLORIDE, CALCIUM CHLORIDE 600; 310; 30; 20 MG/100ML; MG/100ML; MG/100ML; MG/100ML
INJECTION, SOLUTION INTRAVENOUS CONTINUOUS
Status: DISCONTINUED | OUTPATIENT
Start: 2023-01-24 | End: 2023-01-24 | Stop reason: HOSPADM

## 2023-01-24 RX ORDER — HYDROMORPHONE HCL 110MG/55ML
0.5 PATIENT CONTROLLED ANALGESIA SYRINGE INTRAVENOUS EVERY 5 MIN PRN
Status: DISCONTINUED | OUTPATIENT
Start: 2023-01-24 | End: 2023-01-24 | Stop reason: HOSPADM

## 2023-01-24 RX ORDER — ONDANSETRON 2 MG/ML
4 INJECTION INTRAMUSCULAR; INTRAVENOUS EVERY 6 HOURS PRN
Status: DISCONTINUED | OUTPATIENT
Start: 2023-01-24 | End: 2023-01-24 | Stop reason: HOSPADM

## 2023-01-24 RX ORDER — SODIUM CHLORIDE 9 MG/ML
INJECTION, SOLUTION INTRAVENOUS PRN
Status: DISCONTINUED | OUTPATIENT
Start: 2023-01-24 | End: 2023-01-24 | Stop reason: HOSPADM

## 2023-01-24 RX ADMIN — DEXAMETHASONE SODIUM PHOSPHATE 8 MG: 4 INJECTION, SOLUTION INTRAMUSCULAR; INTRAVENOUS at 07:45

## 2023-01-24 RX ADMIN — FENTANYL CITRATE 50 MCG: 50 INJECTION, SOLUTION INTRAMUSCULAR; INTRAVENOUS at 07:26

## 2023-01-24 RX ADMIN — BUPIVACAINE HYDROCHLORIDE 30 ML: 5 INJECTION, SOLUTION EPIDURAL; INTRACAUDAL at 06:50

## 2023-01-24 RX ADMIN — ROCURONIUM BROMIDE 50 MG: 10 INJECTION, SOLUTION INTRAVENOUS at 07:26

## 2023-01-24 RX ADMIN — TRANEXAMIC ACID 1000 MG: 1 INJECTION, SOLUTION INTRAVENOUS at 08:33

## 2023-01-24 RX ADMIN — TRANEXAMIC ACID 1000 MG: 1 INJECTION, SOLUTION INTRAVENOUS at 07:40

## 2023-01-24 RX ADMIN — PROPOFOL 125 MG: 10 INJECTION, EMULSION INTRAVENOUS at 07:26

## 2023-01-24 RX ADMIN — CEFAZOLIN 2000 MG: 2 INJECTION, POWDER, FOR SOLUTION INTRAMUSCULAR; INTRAVENOUS at 12:58

## 2023-01-24 RX ADMIN — SUGAMMADEX 200 MG: 100 INJECTION, SOLUTION INTRAVENOUS at 08:34

## 2023-01-24 RX ADMIN — FENTANYL CITRATE 50 MCG: 50 INJECTION, SOLUTION INTRAMUSCULAR; INTRAVENOUS at 08:31

## 2023-01-24 RX ADMIN — HYDROMORPHONE HYDROCHLORIDE 0.5 MG: 2 INJECTION, SOLUTION INTRAMUSCULAR; INTRAVENOUS; SUBCUTANEOUS at 09:14

## 2023-01-24 RX ADMIN — ONDANSETRON 4 MG: 2 INJECTION INTRAMUSCULAR; INTRAVENOUS at 08:34

## 2023-01-24 RX ADMIN — LIDOCAINE HYDROCHLORIDE 50 MG: 20 INJECTION, SOLUTION EPIDURAL; INFILTRATION; INTRACAUDAL; PERINEURAL at 07:26

## 2023-01-24 RX ADMIN — SODIUM CHLORIDE, POTASSIUM CHLORIDE, SODIUM LACTATE AND CALCIUM CHLORIDE: 600; 310; 30; 20 INJECTION, SOLUTION INTRAVENOUS at 07:18

## 2023-01-24 RX ADMIN — CEFAZOLIN 2 G: 1 INJECTION, POWDER, FOR SOLUTION INTRAVENOUS at 07:35

## 2023-01-24 RX ADMIN — PROPOFOL 20 MG: 10 INJECTION, EMULSION INTRAVENOUS at 06:50

## 2023-01-24 RX ADMIN — TRANEXAMIC ACID 1000 MG: 1 INJECTION, SOLUTION INTRAVENOUS at 07:10

## 2023-01-24 ASSESSMENT — ENCOUNTER SYMPTOMS: SHORTNESS OF BREATH: 0

## 2023-01-24 ASSESSMENT — PAIN DESCRIPTION - LOCATION: LOCATION: KNEE

## 2023-01-24 ASSESSMENT — PAIN DESCRIPTION - ORIENTATION: ORIENTATION: LEFT

## 2023-01-24 ASSESSMENT — PAIN SCALES - GENERAL: PAINLEVEL_OUTOF10: 0

## 2023-01-24 ASSESSMENT — PAIN - FUNCTIONAL ASSESSMENT: PAIN_FUNCTIONAL_ASSESSMENT: NONE - DENIES PAIN

## 2023-01-24 NOTE — PROGRESS NOTES
Pt resting quietly in bed, awake, denies pain at this time. VSS, O2 sats 99% on room air. Dressing to left leg dry and intact, neuro assessment WDL. PT/OT paged. Pt seen by anesthesia, phase 1 criteria met. Will transfer pt to same day.

## 2023-01-24 NOTE — ADDENDUM NOTE
Addendum  created 01/24/23 1031 by CLEMENT Koo CRNA    Flowsheet accepted, Intraprocedure Event edited, Intraprocedure Staff edited

## 2023-01-24 NOTE — ANESTHESIA PROCEDURE NOTES
Peripheral Block    Patient location during procedure: pre-op  Reason for block: post-op pain management and at surgeon's request  Start time: 1/24/2023 6:50 AM  End time: 1/24/2023 6:53 AM  Staffing  Performed: anesthesiologist   Anesthesiologist: Jackson Beckman MD  Preanesthetic Checklist  Completed: patient identified, IV checked, site marked, risks and benefits discussed, surgical/procedural consents, equipment checked, pre-op evaluation, timeout performed, anesthesia consent given, oxygen available, monitors applied/VS acknowledged, fire risk safety assessment completed and verbalized and blood product R/B/A discussed and consented  Peripheral Block   Patient position: supine  Prep: ChloraPrep  Provider prep: mask and sterile gloves  Patient monitoring: cardiac monitor, continuous pulse ox, frequent blood pressure checks and IV access  Block type: Saphenous  Laterality: left  Injection technique: single-shot  Guidance: ultrasound guided  Local infiltration: lidocaine  Infiltration strength: 1 %  Local infiltration: lidocaine  Dose: 3 mL    Needle   Needle type: insulated echogenic nerve stimulator needle   Needle gauge: 21 G  Needle localization: ultrasound guidance  Needle length: 10 cm  Assessment   Injection assessment: negative aspiration for heme, no paresthesia on injection and local visualized surrounding nerve on ultrasound  Paresthesia pain: none  Slow fractionated injection: yes  Hemodynamics: stable  Real-time US image taken/store: yes  Outcomes: uncomplicated    Additional Notes  30 ml 0.5% bupi  20 mg Propofol. No complications.           Medications Administered  bupivacaine (PF) 0.5 % - Perineural   30 mL - 1/24/2023 6:50:00 AM

## 2023-01-24 NOTE — PROGRESS NOTES
Pt arrived from OR to PACU, awakens to voice. VSS, O2 sats 100% on 3 L NC. Dressing to left leg dry and intact, neuro assessment WDL. Left pedal and posterior tibial pulses palpable, foot warm. Will monitor.

## 2023-01-24 NOTE — H&P
Date of Surgery Update:  Ruthine Pallas was seen, history and physical examination reviewed, and patient examined by me today. There have been no significant clinical changes since the completion of the previous history and physical.    The risk, benefits, and alternatives of the proposed procedure have been explained to the patient (or appropriate guardian) and understanding verbalized. All questions answered. Patient wishes to proceed.     Electronically signed by: Laly Mercer MD,1/24/2023,7:14 AM

## 2023-01-24 NOTE — OP NOTE
PREOPERATIVE DIAGNOSIS(ES): left knee arthritis. POSTOPERATIVE DIAGNOSIS(ES): left knee arthritis. PROCEDURE(S) PERFORMED:   1. left total knee arthroplasty        SURGEON: Krissy Chatman MD   Assistant: Bertha Rosales PA-C  The PA was integral to the completion of the surgery efficiently and safely. The PA assisted with positioning, exposure, application of implants, wound closure and dressings. ANESTHESIA: Regional and general.     INDICATIONS: This patient has longstanding knee  deformity and pain. The patient has failed nonsurgical management for many years to  include medications, activity limitations, physical therapy and  intra-articular injections. Due to  severe pain, the patient would like to  proceed with knee replacement. IMPLANTS: Peter Persona total knee system:   size 12 CR femur,   Size G tibia  size 14, MC,  mm polyethylene surface,   size 35 patella. Findings: Severe OA of the knee with 8 degree varus deformity. Severe medial wear. Severe OA and spurs all three compartments. Severe synovitis. Post implants, 0-130 rom. Stable to varus and valgus at 0, 45, 90 degrees. Patella stable      DETAILS OF PROCEDURE: Patient was identified. Site of surgery was marked. The patient underwent a regional block by the anesthesia service,  then put  under general anesthetic.  positioned supine. Tourniquet on the  upper thigh was applied. Sterile prep and drape with ChloraPrep was  performed. Tourniquet was inflated after exsanguination, and [place] Debbie was used  over the operative field. An Pina leg bianchi was used to hold the knee flexed throughout the case. A midline incision was made over the knee. A standard median parapatellar arthrotomy was  made. I released soft tissues anteriorly. I everted the patella, measured the thickness and  resected the undersurface using a clamp and an oscillating saw.     I then exposed the proximal  tibia with full flexion and retractors placed medially, laterally and  posteriorly. The extrenal tibial alignment guide was applied to the proximal tibia,  and then the cutting guide was set. The proximal tibia was cut with the oscillating saw. The 2 mm stylus was used on the low point of the tibia.     The distal femur was cut using the intramedullary guide set at 5 degree valgus.  Once this was done, spacer blocks were applied. The 10 mm  spacer fit well after doing a medial release of the soft tissues and MCL  using a Singleton elevator. I then  finished the femur with a 4-in-1 cutting block. The  posterior capsule was released with a Singleton elevator as well as osteophyte  release from the posterior condyle using a curved osteotome. Meniscus  posteriorly was released at this time as well. I then finished the tibia by  exposing it and cutting the fins with the tibial tray set at the medial 1/3  of the tubercle. Trial implants were placed. Good stability was found. The  lug holes for the patellar implant were applied. Good tracking of the  patella was found.   Bone surfaces were irrigated thoroughly with saline with bacitracin.   Posterior capsule was injected with an orthopedic mixture. I dried the bone  surfaces, applied the cement and implanted the tibia and femur as well as the  poly surface. I applied cement on the patella and implanted the metallic  implant. I held compression on the knee while the cement dried. Excess  cement was carefully identified and scraped from the edges.     I irrigated the knee with Iricept followed by an irrigation of saline again. I also applied topical vanco to the deep and superficial aspects  of the wound during wound closure.    I injected deep and superficial tissues with a standard orthopedic mixture containing morphine bupivicaine, epinephrine, and toradol.     I closed the capsule with running #2 Ethibond suture in 2 separate segments. I ran a #1 Statafix at quad tendon and capsule medially.  The subcutaneous tissue was closed in a  layered fashion using 0 Vicryl and 2-0 Vicryl followed by 4-0 Monocryl and prineo Dermabond on the skin. The knee was wrapped with ace, and the tourniquet was deflated. Estimated blood loss: minimal.     DRAINS: None.      Tracy Tabor MD

## 2023-01-24 NOTE — BRIEF OP NOTE
Brief Postoperative Note      Patient: Byron Guillen  YOB: 1940  MRN: [id number]    Date of Procedure: 1/24/2023    Pre-Op Diagnosis: Osteoarthritis of left knee, unspecified osteoarthritis type [M17.12]    Post-Op Diagnosis: Same       Procedure(s):  LEFT TOTAL KNEE ARTHROPLASTY-BLOCK CONTINUOUS-AILEEN    Surgeon(s):  Alejandra Lara MD    Assistant:  Surgical Assistant: James Ruby  Physician Assistant: Clara Clemente PA-C    Anesthesia: General    Estimated Blood Loss (mL): less than 50     Complications: None    Specimens:   * No specimens in log *    Implants:  Implant Name Type Inv.  Item Serial No.  Lot No. LRB No. Used Action   COMPONENT PAT [id number] STD KNEE VIVACIT-E BISI PERSONA - [id number]  COMPONENT PAT [id number] STD KNEE VIVACIT-E BIS[employer] PERSONA  [employer]- [id number] Left 1 Implanted   DUP USE [id number] IMPL KNEE PSN TIB STM 5 DEG SZ G L - [id number] Knee DUP USE [id number] IMPL KNEE PSN TIB STM 5 DEG SZ G L  [employer] [id number] Left 1 Implanted   PSN FEM CR CMT CCR STD SZ12 L - [id number]  PSN FEM CR CMT CCR STD SZ12 L  [employer]- [id number] Left 1 Implanted   PSN MC VE ASF L 14MM 12/GH - [id number]  PSN MC VE ASF L 14MM 12/GH  [facility] [id number] Left 1 Implanted         Drains: * No LDAs found *    Findings: severe OA    Electronically signed by Alejandra Lara MD on 1/24/2023 at 8:38 AM

## 2023-01-24 NOTE — PROGRESS NOTES
[facility]   Phone: [phone]    Occupational Therapy    [x] Initial Evaluation            [] Daily Treatment Note         [] Discharge Summary      Patient: Krupa Mays   : 1940   MRN: [id number]   Date of Service:  2023    Admitting Diagnosis:  Presence of left artificial knee joint  Current Admission Summary:  (L) TKA by Dr. Samir Gabriel on 23  Past Medical History:  has a past medical history of Aortic aneurysm ([facility] [address] Atrial fibrillation ([facility] [address] CAD (coronary artery disease), Hyperlipidemia, RADHA on CPAP, Osteoporosis, Self-catheterizes urinary bladder, and Unspecified sleep apnea. Past Surgical History:  has a past surgical history that includes Bunionectomy (Left); Colonoscopy (); other surgical history; Prostate surgery; Cystocopy (); joint replacement (Bilateral); pacemaker placement; Thoracic aortic aneurysm repair; and Total knee arthroplasty (Right, 2022). Discharge Recommendations: Krupa Mays scored a 20/24 on the AM-PAC ADL Inpatient form. Current research shows that an AM-PAC score of 18 or greater is typically associated with a discharge to the patient's home setting. Patient is safe to return home with 24 hour supervision and assist from son. Patient plans to work with outpatient PT. Please see assessment section for further patient specific details. If patient discharges prior to next session this note will serve as a discharge summary. Please see below for the latest assessment towards goals.        DME Required For Discharge: patient has all required DME for discharge reports his plan to get sock aide     Precautions/Restrictions: high fall risk  Weight Bearing Restrictions: weight bearing as tolerated  [] Right Upper Extremity  [] Left Upper Extremity [] Right Lower Extremity  [x] Left Lower Extremity     Required Braces/Orthotics: no braces required   [] Right  [] Left  Positional Restrictions:no positional restrictions    Pre-Admission Information   Lives With: spouse                  Type of Home: house  Home Layout: two level, able to live on main level, . Comment: basement  Home Access:  2 step to enter with handrail. Handrails are located on Right side. Bathroom Layout: walker accessible, wheelchair accessible, walk in shower  Toilet Height: elevated height  Bathroom Equipment: shower chair  Home Equipment: rolling walker, single point cane, reacher  Transfer Assistance: Independent without use of device  Ambulation Assistance:modified independent with use of cane  ADL Assistance: independent with all ADL's  IADL Assistance: independent with homemaking tasks  Active :        [x] Yes                 [] No  Current Employment: retired. Hobbies: pt is a primary caregiver for his wife  Recent Falls: no falls within last 6 months   Comment: Two sons to provide assistance, pt's wife has dementia, pt is the primary caregiver    Examination   Vision:   Vision Gross Assessment: WFL  Hearing:   WFL  Observation:   General Observation:  L knee ace wrapped. Compression stocking on R leg   Posture:   Fair- rounded shoulders   Sensation:   denies numbness and tingling   ROM:   (B) UE AROM WFL  Strength:   (B) UE strength grossly [facility]    Therapist Clinical Decision Making (Complexity): low complexity  Clinical Presentation: stable      Subjective  General: Patient in bed upon arrival, agreeable to OT/PT evaluation. Son present for entire session. Pain: 0/10 at rest. Pain with mobility but does not rate   Pain Interventions: ice applied        Activities of Daily Living  Basic Activities of Daily Living  Upper Extremity Dressing: setup assistance  Lower Extremity Dressing: moderate assistance maximum assistance  Dressing Comments: modA for threading underwear and pants. maxA for donning socks. Toileting: contact guard assistance.     Toileting Comments: patient straight cathed himself standing at toilet  ~5 mins with CGA  General Comments: patient reporting he ordered sock aide and son plans to be with him for the first week to assist   Instrumental Activities of Daily Living  No IADL completed on this date. Functional Mobility  Bed Mobility  Supine to Sit: minimal assistance  Sit to Supine: minimal assistance  Comments: Renetta at LLE   Transfers  Sit to stand transfer:contact guard assistance  Stand to sit transfer: contact guard assistance  Car transfer: contact guard assistance  Comments: cues for hand placement. Increased effort noted. Car transfer simulated with transport chair   Functional Mobility:  Standing Balance: contact guard assistance. Standing Balance Comment: with RW  Functional Mobility: .  contact guard assistance  Functional Mobility Activity: ~84 ft total during eval   Functional Mobility Device Use: rolling walker  Functional Mobility Comment: improved with mobility    patient able to ascend 2 steps with Renetta and descend 2 steps with CGA. Education provided to son on how to assist     Other Therapeutic Interventions    Functional Outcomes  AM-PAC Inpatient Daily Activity Raw Score: 20    Cognition  WFL  Orientation:    alert and oriented x 4  Command Following:   [facility]     Education  Barriers To Learning: none  Patient Education: patient educated on goals, OT role and benefits, plan of care, ADL adaptive strategies, proper use of assistive device/equipment, family education, disease specific education, transfer training, discharge recommendations  Learning Assessment:  patient verbalizes and demonstrates understanding    Assessment  Activity Tolerance: patient tolerated well   Impairments Requiring Therapeutic Intervention: decreased functional mobility, decreased ADL status, decreased endurance, decreased balance, decreased IADL, increased pain  Prognosis: good  Clinical Assessment: Patient presenting below baseline functional status secondary to L TKA.  Patient typically independent with Adls and mobility with no device. Patient requiring increased assistance for LB ADLs but able to complete mobility with CGA and RW. Patient is safe to return home with supervision and assist from son. Patient reports he plans to start outpatient PT. Patient educated on the importance of completing exercises, mobility and following up with outpatient PT. Safety Interventions: patient left in bed, call light within reach, nurse notified, family/caregiver present, and left in bed in pacu     Plan  Frequency: Eval with same day discharge. No follow up required. Current Treatment Recommendations: not applicable, evaluation completed with same day discharge. Goals  Patient eval with same day discharge. No goals set secondary to patient discharging home with supervision and assist from family and plan to start OP. Therapy Session Time     Individual Group Co-treatment   Time In    1101   Time Out    1159   Minutes    58        Timed Code Treatment Minutes:  Timed Code Treatment Minutes: 43 Minutes  Total Treatment Minutes:  58 minutes     Units billed split with PT due to patient's observation status.         Electronically Signed By: Eve Addison, [address] [facility], [address] [place] [address] OTR/L [id number]

## 2023-01-24 NOTE — PROGRESS NOTES
Pt received from PACU. No complaints of pain, vitals stable. Dressing to L knee clean, dry and intact. Pt tolerating liquids and crackers. Will continue to monitor.

## 2023-01-24 NOTE — PROGRESS NOTES
[facility]   Phone: [phone]    Physical Therapy    [x] Initial Evaluation            [] Daily Treatment Note         [] Discharge Summary      Patient: Vicente Moreno   : 1940   MRN: [id number]   Date of Service:  2023  Admitting Diagnosis: Presence of left artificial knee joint  Current Admission Summary: (L) TKA by Dr. Eh Rodriguez on 23   Per discussion with RN Quinn Clark), Dr. Eh Rodriguez is aware of radiology results from post-op X-ray and cleared pt for therapy. Past Medical History:  has a past medical history of Aortic aneurysm ([address] Atrial fibrillation ([address] CAD (coronary artery disease), Hyperlipidemia, RADHA on CPAP, Osteoporosis, Self-catheterizes urinary bladder, and Unspecified sleep apnea. Past Surgical History:  has a past surgical history that includes Bunionectomy (Left); Colonoscopy (); other surgical history; Prostate surgery; Cystocopy (); joint replacement (Bilateral); pacemaker placement; Thoracic aortic aneurysm repair; and Total knee arthroplasty (Right, 2022). Discharge Recommendations: Vicente Moreno scored a 17/24 on the AM-PAC short mobility form. Current research shows that an AM-PAC score of 18 or greater is typically associated with a discharge to the patient's home setting. Based on the patient's AM-PAC score and their current functional mobility deficits, it is recommended that the patient have 2-3 sessions per week of Physical Therapy at d/c to increase the patient's independence. At this time, this patient demonstrates the endurance and safety to discharge home with 24/7 assist and Outpatient Physical Therapy and a follow up treatment frequency of 2-3x/wk. Please see assessment section for further patient specific details. Recommending home despite pt's score of 17/24 as pt's son will be with him for the next week and is able to provide assistance. The pt's son was present for the entire therapy session. If patient discharges prior to next session this note will serve as a discharge summary. Please see below for the latest assessment towards goals. DME Required For Discharge: no DME required at discharge - has RW    Precautions/Restrictions: high fall risk  Weight Bearing Restrictions: weight bearing as tolerated  [] Right Upper Extremity  [] Left Upper Extremity [] Right Lower Extremity  [x] Left Lower Extremity     Required Braces/Orthotics: no braces required   [] Right  [] Left  Positional Restrictions:no positional restrictions    Pre-Admission Information   Lives With: spouse                  Type of Home: house  Home Layout: two level, able to live on main level, . Comment: basement  Home Access:  2 step to enter with handrail. Handrails are located on Right side. Bathroom Layout: walker accessible, wheelchair accessible, walk in shower  Toilet Height: elevated height  Bathroom Equipment: shower chair  Home Equipment: rolling walker, single point cane, reacher  Transfer Assistance: Independent without use of device  Ambulation Assistance:modified independent with use of cane  ADL Assistance: independent with all ADL's  IADL Assistance: independent with homemaking tasks  Active :        [x] Yes                 [] No  Current Employment: retired. Hobbies: pt is a primary caregiver for his wife  Recent Falls: no falls within last 6 months   Comment: Two sons to provide assistance, pt's wife has dementia, pt is the primary caregiver    Examination   Vision:   Vision Gross Assessment: WFL  Hearing:   WFL  Observation:   General Observation:  Pt on RA throughout session. ACE wrap in place on (L) LE, KRIS hose on (R) LE.   Posture:   Fair - forward head, rounded shoulders  Sensation:   WFL  Proprioception:    WFL  ROM:   (B) Hip AROM WFL  (L) Knee: Approx -10 to 90 deg flexion, limited by ACE wrap, not formally measured     (R) Knee: Approx -10 deg to 100 deg flexion  (B) Ankle AROM WFL  Strength:   (L) knee testing deferred due to s/p TKA  (R) knee grossly 4+/5  Therapist Clinical Decision Making (Complexity): low complexity  Clinical Presentation: stable      Subjective  General: Patient semi-reclined in bed upon therapist arrival. Agreeable to PT/OT eval. Pt joking a lot. Son present for session. Pain: 0/10 at rest, pt indicates he has more pain with mobility but doesn't rate it  Pain Interventions: ice applied and repositioned        Functional Mobility  Bed Mobility  Supine to Sit: minimal assistance  Sit to Supine: minimal assistance  Scooting: stand by assistance  Comments: Supine<>sit with HOB slightly elevated. Pt required increased time and Min A at (L) LE. Transfers  Sit to stand transfer: contact guard assistance  Stand to sit transfer: contact guard assistance  Car transfer: contact guard assistance  Comments: Multiple sit<>stand trials throughout session with increased time and effort. The patient requires cues for hand placement with most transfers. Car transfer mimicked with transport chair. Ambulation  Surface:level surface  Assistive Device: rolling walker  Assistance: contact guard assistance  Distance: 84 ft + multiple short distances  Gait Mechanics: Slow sofia, shuffling gait initially, step-to pattern, decreased terminal knee extension on (L) and in stance phase  Comments:  Pt able to improve step length with cues to focus on (L) terminal knee extension, requires constant cues to maintain  Stair Mobility  Number of Steps: 2  Step Height: 6 inch  Hand Rails: (R) ascending handrail  Assistance: minimal assistance  Comments: Pt ascended with Min A for (L) LE to clear step. Pt descended backward with CGA and therapist guarding (L) LE. Pt requires increased time and effort with stairs but does not require increased assist for balance. Wheelchair Mobility:  No w/c mobility completed on this date.   Comments:  Balance  Static Sitting Balance: good: independent with functional balance in unsupported position  Dynamic Sitting Balance: fair (+): maintains balance at SBA/supervision without use of UE support  Static Standing Balance: fair (-): maintains balance at CGA with use of UE support  Dynamic Standing Balance: fair (-): maintains balance at CGA with use of UE support  Comments: Pt stood x5 minutes to straight cath at toilet without UE support, with CGA. Other Therapeutic Interventions  See OT note for assist with dressing and toilet. Reviewed HEP packet with pt's son and patient. Ankle pumps x10 reps (B)  Seated heel slides x8 reps on (L)    Functional Outcomes  AM-PAC Inpatient Mobility Raw Score : 17              Cognition  WFL  Safety Judgement: decreased awareness of need for assistance, decreased awareness of need for safety  Orientation:    alert and oriented x 4  Command Following:   [facility]  Barriers To Learning: none  Patient Education: patient educated on goals, PT role and benefits, plan of care, weight-bearing education, HEP, general safety, functional mobility training, proper use of assistive device/equipment, family education, disease specific education, transfer training, discharge recommendations  Learning Assessment:  patient verbalizes understanding, would benefit from continued reinforcement    Assessment  Activity Tolerance: Pt tolerated treatment session well with no adverse symptoms or reactions. Impairments Requiring Therapeutic Intervention: decreased functional mobility, decreased ADL status, decreased ROM, decreased strength, decreased safety awareness, decreased endurance, decreased balance, increased pain  Prognosis: fair  Clinical Assessment: The patient is an 80 yo male admitted to [facility] s/p (L) TKA by Dr. Deisy Garcia. The patient had his (R) knee done just last year. At this time the patient is able to ambulate household distances and will require CGA for safety which is son is able to provide.  The patient requried Min A to clear (L) foot when ascending step but son present for stair training and has a gait belt to assist the patient at home. Pt educated on HEP and strongly encouraged to follow-up with the outpatient physical therapy for several weeks. Pt would benefit from ongoing physical therapy to safely progress tolerance to activity, independence with functional mobility, and ROM s/p (L) TKA.    Safety Interventions: patient left in bed, gait belt, patient at risk for falls, nurse notified, family/caregiver present, and pt in PACU cot with no bed alarm, RN aware    Plan  Frequency: 7 x/week BID tx  Current Treatment Recommendations: strengthening, ROM, balance training, functional mobility training, transfer training, gait training, stair training, endurance training, patient/caregiver education, pain management, home exercise program, safety education, equipment evaluation/education, and positioning    Goals  Patient Goals: Go home   Short Term Goals:  Time Frame: Before discharge  Patient will complete bed mobility at supervision   Patient will complete transfers at supervision   Patient will ambulate 100 ft with use of rolling walker at supervision  Patient will ascend/descend 2 stairs with (R) ascending handrail at stand by assistance  Patient will complete car transfer at supervision  Patient to maintain standing at supervision for 8 minutes without UE support    Therapy Session Time      Individual Group Co-treatment   Time In     1101   Time Out     1159   Minutes     58     Timed Code Treatment Minutes:  8 Minutes  Total Treatment Minutes:  58 minutes  Pt in observation status, billing split with OT (2 units billed)       Electronically Signed By: Andrea Meraz PT      Anton Suresh PT, DPT #[id number]

## 2023-01-24 NOTE — ANESTHESIA POSTPROCEDURE EVALUATION
Department of Anesthesiology  Postprocedure Note    Patient: Sofi Hernandez  MRN: [id number]  YOB: 1940  Date of evaluation: 1/24/2023      Procedure Summary     Date: 01/24/23 Room / Location: [facility] OR [address] [place] [address]    Anesthesia Start: Gus Frausto Anesthesia Stop: 5388    Procedure: LEFT TOTAL KNEE ARTHROPLASTY-BLOCK CONTINUOUS-AILEEN (Left: Knee) Diagnosis:       Osteoarthritis of left knee, unspecified osteoarthritis type      (Osteoarthritis of left knee, unspecified osteoarthritis type [M17.12])    Surgeons: Amberly Loco MD Responsible Provider: Jackson Beckman MD    Anesthesia Type: general ASA Status: 4          Anesthesia Type: No value filed.     Jazmin Phase I: Jazmin Score: 10    Jazmin Phase II:        Anesthesia Post Evaluation    Patient location during evaluation: PACU  Level of consciousness: awake  Complications: no  Multimodal analgesia pain management approach
Detail Level: Generalized
Detail Level: Detailed

## 2023-01-24 NOTE — PROGRESS NOTES
Nursing care provided to prep patient for surgery. Pre-op orders completed. Verified patient has completed 5 days of CHG pre-op showers. Pneumatic sleeves and compression stockings applied as ordered. Teaching / education initiated regarding perioperative experience, post-op expectations, plan of care, and pain management during hospital stay. Patient verbalized understanding. Pain Goal Expectations 3/10. The care plan and education has been reviewed and mutually agreed upon with the patient.

## 2023-01-24 NOTE — PROGRESS NOTES
Pt discharged home per MD orders. Pt and pt son given discharge instructions including medication, follow up, wound care, signs of complications and numbers to call with any questions. Pt and son verbalize understanding and state no questions. Pt peripheral IV removed, intact, bleeding controlled, Pt safely transported off unit with all belongings. Pt transported home via son.

## 2023-01-24 NOTE — ANESTHESIA PRE PROCEDURE
Department of Anesthesiology  Preprocedure Note       Name:  Mima Cheng   Age:  79 y.o.  :  1940                                          MRN:  [id number]         Date:  2023      Surgeon: Celestino Flannery):  Mickey Casey MD    Procedure: Procedure(s):  LEFT TOTAL KNEE ARTHROPLASTY-BLOCK CONTINUOUS-AILEEN    Medications prior to admission:   Prior to Admission medications    Medication Sig Start Date End Date Taking? Authorizing Provider   Enoxaparin Sodium (LOVENOX SC) Inject into the skin 2 times daily 22   Historical Provider, MD   allopurinol (ZYLOPRIM) 100 MG tablet Take 100 mg by mouth daily    Historical Provider, MD   CRANBERRY EXTRACT PO Take by mouth as needed If UTI    Historical Provider, MD   warfarin (COUMADIN) 5 MG tablet TAKE 1 TABLET BY MOUTH EVERY DAY 16   Jania Rodrigues, DO   carvedilol (COREG) 12.5 MG tablet  3/3/16   Janiece Paget   torsemide (DEMADEX) 20 MG tablet TAKE 1 TABLET BY MOUTH DAILY 16   Clarissa Hull,    fluticasone (FLONASE) 50 MCG/ACT nasal spray USE ONE SPRAY IN EACH NOSTRIL ONCE DAILY 16   Jania Rodrigues,    warfarin (COUMADIN) 6 MG tablet Take 1 tablet by mouth daily 16   Clarissa Hull DO   alendronate (FOSAMAX) 70 MG tablet TAKE 1 TABLET EVERY WEEK 16   Shola Hull,    Psyllium (METAMUCIL PO) Take 30 mg by mouth as needed    Historical Provider, MD   tamsulosin (FLOMAX) 0.4 MG capsule Take 0.4 mg by mouth daily. 3/13/13   Historical Provider, MD   NITROSTAT 0.4 MG SL tablet Place 0.4 mg under the tongue every 5 minutes as needed. 13   Historical Provider, MD   lisinopril (PRINIVIL;ZESTRIL) 2.5 MG tablet Take  by mouth daily. 13   Historical Provider, MD   Omega-3 Fatty Acids (FISH OIL) 1200 MG CAPS Take 1,200 mg by mouth daily. Historical Provider, MD   Calcium Carbonate (CALTRATE 600 PO) Take 600 mg by mouth daily.     Historical Provider, MD       Current medications:    No current facility-administered medications for this visit. No current outpatient medications on file. Facility-Administered Medications Ordered in Other Visits   Medication Dose Route Frequency Provider Last Rate Last Admin    ortho mix injection   Injection On Call Rd Mane MD        tranexamic acid (CYKLOKAPRON) 1,000 mg in sodium chloride 0.9 % 60 mL IVPB  1,000 mg IntraVENous Once Rd Mane MD        tranexamic acid (CYKLOKAPRON) 1,000 mg in sodium chloride 0.9 % 60 mL IVPB  1,000 mg IntraVENous On Call to Meliton Treadwell MD           Allergies: Allergies   Allergen Reactions    Pcn [Penicillins]      unknown    Retavase [Reteplase]        Problem List:    Patient Active Problem List   Diagnosis Code    Long term current use of anticoagulant therapy Z79.01    Atrial fibrillation ([facility] [address] I48.91    Sleep apnea G47.30    Hyperlipidemia E78.5    Osteoporosis M81.0    Aortic aneurysm (HCC) I71.9       Past Medical History:        Diagnosis Date    Aortic aneurysm ([facility] [address]     Atrial fibrillation ([facility] [address]     CAD (coronary artery disease)     Hyperlipidemia     RADHA on CPAP     Osteoporosis     Self-catheterizes urinary bladder     Unspecified sleep apnea        Past Surgical History:        Procedure Laterality Date    BUNIONECTOMY Left     COLONOSCOPY  2005    CYSTOSCOPY      Dr. Wilver Mcnally Bilateral     realignment   [facility]      blood clot in right common illiac vein    PACEMAKER PLACEMENT      PROSTATE SURGERY      turp    THORACIC AORTIC ANEURYSM REPAIR      TOTAL KNEE ARTHROPLASTY Right 2022    RIGHT TOTAL KNEE ARTHROPLASTY-BLOCK NON-CONTINUOUS-AILEEN performed by Rd Mane MD at [address] [place] [address] History:    Social History     Tobacco Use    Smoking status: Former     Packs/day: 0.50     Years: 10.00     Pack years: 5.00     Types: Cigarettes     Quit date: 1960     Years since quittin.7    Smokeless tobacco: Never   Substance Use Topics    Alcohol use:  No Counseling given: Not Answered      Vital Signs (Current): There were no vitals filed for this visit. BP Readings from Last 3 Encounters:   07/26/22 122/68   06/10/16 110/68   04/26/16 110/74       NPO Status:                                                                                 BMI:   Wt Readings from Last 3 Encounters:   12/29/22 245 lb (111.1 kg)   07/26/22 240 lb (108.9 kg)   06/10/16 247 lb 12.8 oz (112.4 kg)     There is no height or weight on file to calculate BMI.    CBC:   Lab Results   Component Value Date/Time    WBC 7.1 01/13/2023 02:45 PM    RBC 4.39 01/13/2023 02:45 PM    HGB 13.7 01/13/2023 02:45 PM    HCT 42.1 01/13/2023 02:45 PM    MCV 96.0 01/13/2023 02:45 PM    RDW 16.6 01/13/2023 02:45 PM     01/13/2023 02:45 PM       CMP:   Lab Results   Component Value Date/Time     01/13/2023 02:45 PM    K 4.6 01/13/2023 02:45 PM     01/13/2023 02:45 PM    CO2 27 01/13/2023 02:45 PM    BUN 18 01/13/2023 02:45 PM    CREATININE 0.9 01/13/2023 02:45 PM    GFRAA >60 07/14/2022 09:40 AM    LABGLOM >60 01/13/2023 02:45 PM    GLUCOSE 92 01/13/2023 02:45 PM    PROT 6.5 06/07/2016 01:52 PM    CALCIUM 9.0 01/13/2023 02:45 PM    BILITOT 0.6 06/07/2016 01:52 PM    ALKPHOS 66 06/07/2016 01:52 PM    AST 17 06/07/2016 01:52 PM    ALT 9 06/07/2016 01:52 PM       POC Tests:   No results for input(s): POCGLU, POCNA, POCK, POCCL, POCBUN, POCHEMO, POCHCT in the last 72 hours.     Coags:   Lab Results   Component Value Date/Time    PROTIME 22.3 01/13/2023 02:45 PM    PROTIME 2.3 11/13/2015 09:00 AM    INR 1.95 01/13/2023 02:45 PM    APTT 36.7 01/13/2023 02:45 PM       HCG (If Applicable): No results found for: PREGTESTUR, PREGSERUM, HCG, HCGQUANT     ABGs: No results found for: PHART, PO2ART, AZT4XRQ, DNB8UUL, BEART, I5WWICDC     Type & Screen (If Applicable):  Lab Results   Component Value Date    LABABO O%POS^^POSITIVE 08/01/2014       Drug/Infectious Status (If Applicable):  No results found for: HIV, HEPCAB    COVID-19 Screening (If Applicable): No results found for: COVID19        Anesthesia Evaluation  Patient summary reviewed and Nursing notes reviewed no history of anesthetic complications:   Airway: Mallampati: I  TM distance: >3 FB   Neck ROM: full  Mouth opening: > = 3 FB   Dental: normal exam         Pulmonary:   (+) sleep apnea: on CPAP,      (-) asthma and shortness of breath                           Cardiovascular:    (+) pacemaker: AICD and pacemaker, CAD:, dysrhythmias: atrial fibrillation and SVT, CHF: systolic, hyperlipidemia    (-) hypertension and  angina             ROS comment: S/p thoracic AA repair w/ maze     Neuro/Psych:      (-) CVA           GI/Hepatic/Renal:        (-) GERD and liver disease       Endo/Other:        (-) diabetes mellitus, hypothyroidism               Abdominal:             Vascular:   + DVT, .  - PVD. Other Findings:             Anesthesia Plan      general     ASA 4       Induction: intravenous. MIPS: Postoperative opioids intended and Prophylactic antiemetics administered. Anesthetic plan and risks discussed with patient. Use of blood products discussed with patient whom. Plan discussed with CRNA.                     Kaila Manrique MD   1/24/2023

## 2023-01-25 ENCOUNTER — CARE COORDINATION (OUTPATIENT)
Dept: CASE MANAGEMENT | Age: 83
End: 2023-01-25

## 2023-01-25 NOTE — CARE COORDINATION
Spoke with Siva Gutierrez    Incision status: Dressing CDI    Edema/Swelling: small amount    Pain level and status: Denies pain; no pain med just ice    Use of pain medications: no    Bowels: not as yet, using stool softner    [address] active: n/a    Outpatient therapy: yes, [facility]    Do you have all of your medications: yes    Changes in medications: no    Pt states doing well, no issues or concerns. Son will set up his OUTPT therapy. Agreed to more [facility] f/u calls, will transition to [address] nurse. Follow up appointments:    No future appointments.

## 2023-02-01 ENCOUNTER — CARE COORDINATION (OUTPATIENT)
Dept: CASE MANAGEMENT | Age: 83
End: 2023-02-01

## 2023-02-01 NOTE — CARE COORDINATION
Spoke with patient. Incision status: States dressing dry and intact with no drainage. Edema/Swelling: States swelling has improved. Continues to ice and elevate. Pain level and status: States not having any pain. Use of pain medications: States not taking pain meds. Bowels: No complaints. Outpatient therapy: Continues. Do you have all of your medications: Yes, states he is taking warfarin again. Changes in medications: No    Follow up appointments:    No future appointments.   Priti Silk BSN  Care Transition Nurse for ortho bundle  [phone]

## 2023-02-08 ENCOUNTER — CARE COORDINATION (OUTPATIENT)
Dept: CARE COORDINATION | Age: 83
End: 2023-02-08

## 2023-02-08 NOTE — CARE COORDINATION
Attempted to reach pt for [facility] follow up call. Left message requesting call back. Follow up appointments:    No future appointments.

## 2023-02-15 ENCOUNTER — CARE COORDINATION (OUTPATIENT)
Dept: CASE MANAGEMENT | Age: 83
End: 2023-02-15

## 2023-02-15 NOTE — CARE COORDINATION
Spoke with patient. Incision status: States free from redness or drainage. Edema/Swelling: States swelling in ankles bilat. Pain level and status: States not having any pain. Use of pain medications: States not taking anything for pain. Bowels: No complaints. Outpatient therapy: Continues with therapy. States he had his INR checked and does not have to return for 6 weeks. Do you have all of your medications: Yes    Changes in medications: No    Follow up appointments:    No future appointments.   Valerie Pierre BSN  Care Transition Nurse for ortho bundle  [phone]

## 2023-02-21 ENCOUNTER — CARE COORDINATION (OUTPATIENT)
Dept: CASE MANAGEMENT | Age: 83
End: 2023-02-21

## 2023-02-21 NOTE — CARE COORDINATION
[facility]. No answer and unable to leave a message, will continue to follow.   Estefany Veloz BSN  Care Transition Nurse for ortho bundle  [phone]

## 2023-02-28 ENCOUNTER — CARE COORDINATION (OUTPATIENT)
Dept: CASE MANAGEMENT | Age: 83
End: 2023-02-28

## 2023-02-28 NOTE — CARE COORDINATION
Spoke with patient. Incision status: States free from redness or drainage. Edema/Swelling:States swelling is doing\"fine\". Pain level and status: States pain is tolerable. Bowels: No complaints. Home Care Agency active: States nurse came to his house today and states that everything is fine? States therapy comes also. Do you have all of your medications: Yes    Changes in medications: No    Follow up appointments:    No future appointments.   Larry Guidry BSN  Care Transition Nurse for ortho bundle  [phone]

## 2023-03-08 ENCOUNTER — CARE COORDINATION (OUTPATIENT)
Dept: CASE MANAGEMENT | Age: 83
End: 2023-03-08

## 2023-03-08 NOTE — CARE COORDINATION
Called patient for follow up with [address] [place] [address] program. Spouse and patient state that it is not a good time, will continue to follow.   Galo Oro BSN  Care Transition Nurse for ortho bundle  [phone]

## 2023-03-15 ENCOUNTER — CARE COORDINATION (OUTPATIENT)
Dept: CASE MANAGEMENT | Age: 83
End: 2023-03-15

## 2023-03-15 NOTE — CARE COORDINATION
Called patient for follow up with  [facility].  Left message for return call.   Estefany Veloz BSN  Care Transition Nurse for ortho bundle  [phone]

## 2023-03-29 ENCOUNTER — CARE COORDINATION (OUTPATIENT)
Dept: CASE MANAGEMENT | Age: 83
End: 2023-03-29

## 2023-03-29 NOTE — CARE COORDINATION
Called patient for follow up with  [facility] program. No answer and unable to leave a message. Will continue to follow.   Tosin Cazares BSN  Care Transition Nurse for ortho bundle  [phone] vomiting

## 2023-04-19 ENCOUNTER — CARE COORDINATION (OUTPATIENT)
Dept: CASE MANAGEMENT | Age: 83
End: 2023-04-19

## 2023-04-19 NOTE — CARE COORDINATION
Called patient for follow up with [facility]. Left message that bundle program and follow up phone calls are ending. Left message to follow up with Dr. Jen Sampson for any complications/concerns.   Rahda Montgomery BSN  Care Transition Nurse for ortho bundle  [phone]

## (undated) DEVICE — SHEET,DRAPE,53X77,STERILE: Brand: MEDLINE

## (undated) DEVICE — 450 ML BOTTLE OF 0.05% CHLORHEXIDINE GLUCONATE IN 99.95% STERILE WATER FOR IRRIGATION, USP AND APPLICATOR.: Brand: IRRISEPT ANTIMICROBIAL WOUND LAVAGE

## (undated) DEVICE — SUTURE ETHBND EXCEL SZ 2 L30IN NONABSORBABLE GRN L40MM V-37 MX69G

## (undated) DEVICE — PEN: MARKING STD 100/CS: Brand: MEDICAL ACTION INDUSTRIES

## (undated) DEVICE — SUTURE VCRL + SZ 2-0 L18IN ABSRB UD CT1 L36MM 1/2 CIR VCP839D

## (undated) DEVICE — DUAL CUT SAGITTAL BLADE

## (undated) DEVICE — BANDAGE COMPR W6INXL10YD ST M E WHITE/BEIGE

## (undated) DEVICE — CONTAINER,SPECIMEN,OR STERILE,4OZ: Brand: MEDLINE

## (undated) DEVICE — TOTAL BASIC PK

## (undated) DEVICE — SYSTEM SKIN CLSR 22CM DERMBND PRINEO

## (undated) DEVICE — PADDING UNDERCAST W4INXL4YD 100% COT CRIMPED FINISH WBRL II

## (undated) DEVICE — SUTURE STRATAFIX SZ 1 L14IN ABSRB VLT L36MM MO-4 TAPERPOINT SXPD2B400

## (undated) DEVICE — BLADE CLIPPER GEN PURP NS

## (undated) DEVICE — DRAPE,U/ SHT,SPLIT,PLAS,STERIL: Brand: MEDLINE

## (undated) DEVICE — DECANTER FLD 9IN ST BG FOR ASEP TRNSF OF FLD

## (undated) DEVICE — SCREW BNE HD 3.5X48 MM HEX PERSONA
Type: IMPLANTABLE DEVICE | Site: KNEE | Status: NON-FUNCTIONAL
Removed: 2023-01-24

## (undated) DEVICE — MERCY FAIRFIELD TURNOVER KIT: Brand: MEDLINE INDUSTRIES, INC.

## (undated) DEVICE — STANDARD HYPODERMIC NEEDLE,POLYPROPYLENE HUB: Brand: MONOJECT

## (undated) DEVICE — DEVICE POS W4INXL5YD KNEE SURG FOAM PD SELF ADH WRP DEMAYO

## (undated) DEVICE — HEADLESS TROCHAR PIN 75MM: Brand: ZUK

## (undated) DEVICE — 3 BONE CEMENT MIXER: Brand: MIXEVAC

## (undated) DEVICE — COVER,MAYO STAND,XL,STERILE: Brand: MEDLINE

## (undated) DEVICE — COVER LT HNDL BLU PLAS

## (undated) DEVICE — PENCIL ES L3M BTTN SWCH S STL HEX LOK BLDE ELECTRD HOLSTER

## (undated) DEVICE — SUTURE VCRL + SZ 0 L18IN ABSRB UD L36MM CT-1 1/2 CIR VCP840D

## (undated) DEVICE — ZIMMER® STERILE DISPOSABLE TOURNIQUET CUFF WITH PLC, DUAL PORT, SINGLE BLADDER, 34 IN. (86 CM)

## (undated) DEVICE — ZIMMER® STERILE DISPOSABLE TOURNIQUET CUFF WITH PLC, DUAL PORT, SINGLE BLADDER, 30 IN. (76 CM)

## (undated) DEVICE — BANDAGE COBAN 6 IN WND 6INX5YD FOAM

## (undated) DEVICE — STRYKER PERFORMANCE SERIES SAGITTAL BLADE: Brand: STRYKER PERFORMANCE SERIES

## (undated) DEVICE — HOOD, PEEL-AWAY: Brand: FLYTE

## (undated) DEVICE — FAIRFIELD KNEE LF

## (undated) DEVICE — STERILE TOTAL KNEE DRAPE PACK: Brand: CARDINAL HEALTH

## (undated) DEVICE — HOOD: Brand: FLYTE

## (undated) DEVICE — SUTURE MCRYL + SZ 4-0 L27IN ABSRB UD L19MM PS-2 3/8 CIR MCP426H

## (undated) DEVICE — Device

## (undated) DEVICE — ELECTRODE PT RET AD L9FT HI MOIST COND ADH HYDRGEL CORDED

## (undated) DEVICE — Device: Brand: POWER-FLO®

## (undated) DEVICE — APPLICATOR PREP 26ML 0.7% IOD POVACRYLEX 74% ISO ALC ST

## (undated) DEVICE — YANKAUER,OPEN TIP,W/O VENT,STERILE: Brand: MEDLINE INDUSTRIES, INC.

## (undated) DEVICE — APPLICATOR MEDICATED 26 CC SOLUTION HI LT ORNG CHLORAPREP

## (undated) DEVICE — MASC TURNOVER KIT: Brand: MEDLINE INDUSTRIES, INC.

## (undated) DEVICE — DRESSING CNTCT 4X12IN IS THERABOND 3D

## (undated) DEVICE — HANDPIECE SET WITH HIGH FLOW TIP AND SUCTION TUBE: Brand: INTERPULSE

## (undated) DEVICE — KNEE HOLDER DISPOSABLE LINER: Brand: ALVARADO®  KNEE SUPPORT

## (undated) DEVICE — SYRINGE MED 10ML TRNSLUC BRL PLUNG BLK MRK POLYPR CTRL

## (undated) DEVICE — PAD,NON-ADHERENT,3X8,STERILE,LF,1/PK: Brand: MEDLINE